# Patient Record
Sex: FEMALE | Race: BLACK OR AFRICAN AMERICAN | Employment: OTHER | RURAL
[De-identification: names, ages, dates, MRNs, and addresses within clinical notes are randomized per-mention and may not be internally consistent; named-entity substitution may affect disease eponyms.]

---
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
## 2018-06-20 ENCOUNTER — OFFICE VISIT (OUTPATIENT)
Dept: INTERNAL MEDICINE CLINIC | Age: 18
End: 2018-06-20

## 2018-06-20 VITALS
BODY MASS INDEX: 41.04 KG/M2 | RESPIRATION RATE: 16 BRPM | TEMPERATURE: 97.7 F | OXYGEN SATURATION: 99 % | DIASTOLIC BLOOD PRESSURE: 76 MMHG | SYSTOLIC BLOOD PRESSURE: 128 MMHG | HEART RATE: 67 BPM | WEIGHT: 223 LBS | HEIGHT: 62 IN

## 2018-06-20 DIAGNOSIS — J06.9 UPPER RESPIRATORY TRACT INFECTION, UNSPECIFIED TYPE: Primary | ICD-10-CM

## 2018-06-20 PROBLEM — E66.01 OBESITY, MORBID (HCC): Status: ACTIVE | Noted: 2018-06-20

## 2018-06-20 RX ORDER — CLOMIPHENE CITRATE 50 MG/1
50 TABLET ORAL DAILY
COMMUNITY
End: 2018-11-30 | Stop reason: ALTCHOICE

## 2018-06-20 RX ORDER — METHYLPREDNISOLONE 4 MG/1
TABLET ORAL
COMMUNITY
End: 2018-11-30 | Stop reason: ALTCHOICE

## 2018-06-20 RX ORDER — ALBUTEROL SULFATE 90 UG/1
AEROSOL, METERED RESPIRATORY (INHALATION)
COMMUNITY

## 2018-06-20 NOTE — PATIENT INSTRUCTIONS
Honey or agave nectar is known to help a cough, 1 teaspoon every 4 hours as needed for cough. Medicines like Mucinex Theraflu or Coricidin may help. Cold symptoms get better on there own without antibiotics. Over the counter cold medications should not be used for children. Acetaminophen (Tylenol) can help with the pain. You can take 2 every 8 hours or up to 6 extra strength (500mg)  Tylenol per day. Aleve or Advil can also be tried.

## 2018-06-20 NOTE — LETTER
NOTIFICATION OF RETURN TO WORK 
 
6/20/2018 3:42 PM 
 
Ms. Judith Carter [address] [id number] Dennis Sandoval To Whom It May Concern: 
 
Judith Carter was under the care of [address]. She will be able to return to work on June 22, 2018. If there are questions or concerns please have the patient contact our office. Sincerely, Chato Ruiz MD

## 2018-06-20 NOTE — MR AVS SNAPSHOT
[address] [address] [place] [address] 
[phone] Patient: Ray Turner MRN: [id number] WVT:3/5/1967 Visit Information Date & Time Provider Department Dept. Phone Encounter #  
 6/20/2018  3:15 PM Mahnaz Alonzo MD [place] [id number] Upcoming Health Maintenance Date Due Hepatitis A Peds Age 1-18 (1 of 2 - Standard Series) 1/5/1999 DTaP/Tdap/Td series (1 - Tdap) 1/5/2005 HPV Age 9Y-34Y (1 of 3 - Female 3 Dose Series) 1/5/2009 Influenza Age 5 to Adult 8/1/2018 Allergies as of 6/20/2018  Review Complete On: 6/20/2018 By: Mahnaz Alonzo MD  
  
 Severity Noted Reaction Type Reactions Pcn [Penicillins]  09/15/2017    Rash Current Immunizations  Reviewed on 9/18/2017 Name Date  
 TB Skin Test (PPD) Intradermal 9/18/2017 Not reviewed this visit You Were Diagnosed With   
  
 Codes Comments Upper respiratory tract infection, unspecified type    -  Primary ICD-10-CM: J06.9 ICD-9-CM: 465.9 Vitals BP Pulse Temp Resp Height(growth percentile) Weight(growth percentile) 128/76 (BP 1 Location: Left arm, BP Patient Position: Sitting) 67 97.7 °F (36.5 °C) (Oral) 16 5' 2\" (1.575 m) 223 lb (101.2 kg) LMP SpO2 BMI OB Status Smoking Status 06/07/2018 99% 40.79 kg/m2 Having regular periods Never Smoker BMI and BSA Data Body Mass Index Body Surface Area 40.79 kg/m 2 2.1 m 2 Preferred Pharmacy Pharmacy Name Phone [address], [address] [phone] Your Updated Medication List  
  
   
This list is accurate as of 6/20/18  3:38 PM.  Always use your most recent med list.  
  
  
  
  
 clomiPHENE 50 mg tablet Commonly known as:  CLOMID Take 50 mg by mouth daily. folic acid 1 mg tablet Commonly known as:  Google Take 1 Tab by mouth daily. methylPREDNISolone 4 mg tablet Commonly known as:  Travis Sinks Take  by mouth. PNV38-Iron Cbn&Gluc-FA-DSS-DHA 35-1- mg Cmpk Take  by mouth. VENTOLIN HFA 90 mcg/actuation inhaler Generic drug:  albuterol Take  by inhalation. Patient Instructions Honey or agave nectar is known to help a cough, 1 teaspoon every 4 hours as needed for cough. Medicines like Mucinex Theraflu or Coricidin may help. Cold symptoms get better on there own without antibiotics. Over the counter cold medications should not be used for children. Acetaminophen (Tylenol) can help with the pain. You can take 2 every 8 hours or up to 6 extra strength (500mg)  Tylenol per day. Aleve or Advil can also be tried. Introducing [facility]! Wilian Baptiste introduces InfoDif patient portal. Now you can access parts of your medical record, email your doctor's office, and request medication refills online. 1. In your internet browser, go to [email or web address] 2. Click on the First Time User? Click Here link in the Sign In box. You will see the New Member Sign Up page. 3. Enter your InfoDif Access Code exactly as it appears below. You will not need to use this code after youve completed the sign-up process. If you do not sign up before the expiration date, you must request a new code. · InfoD[employer] Access Code: [email or web address] Expires: 9/18/2018  3:10 PM 
 
4. Enter the last four digits of your Social Security Number (xxxx) and Date of Birth (mm/dd/yyyy) as indicated and click Submit. You will be taken to the next sign-up page. 5. Create a InfoDif ID. This will be your InfoDif login ID and cannot be changed, so think of one that is secure and easy to remember. 6. Create a [employer] password. You can change your password at any time. 7. Enter your Password Reset Question and Answer. This can be used at a later time if you forget your password. 8. Enter your e-mail address. You will receive e-mail notification when new information is available in [address] 9. Click Sign Up. You can now view and download portions of your medical record. 10. Click the Download Summary menu link to download a portable copy of your medical information. If you have questions, please visit the Frequently Asked Questions section of the Sp[employer] website. Remember, Spoqa is NOT to be used for urgent needs. For medical emergencies, dial 911. Now available from your iPhone and Android! Please provide this summary of care documentation to your next provider. Your primary care clinician is listed as Page Hansen. If you have any questions after today's visit, please call [phone].

## 2018-06-20 NOTE — PROGRESS NOTES
Subjective:   Juan Hooker is a 19 y.o. female who complains of congestion, sneezing, nasal blockage, cough described as productive of yellow sputum and headache N/V for 6 days, gradually worsening since that time. She denies a history of rash. Evaluation to date: seen previously and thought to have a viral URI by the [place] [facility]  Treatment to date: steroids. Her father gave her some LO  Patient does not smoke cigarettes. Relevant PMH: No pertinent additional PMH. Allergies   Allergen Reactions    Pcn [Penicillins] Rash         Patient Active Problem List    Diagnosis Date Noted    Obesity, morbid ([address] 06/20/2018     Current Outpatient Prescriptions   Medication Sig Dispense Refill    clomiPHENE (CLOMID) 50 mg tablet Take 50 mg by mouth daily.  methylPREDNISolone (MEDROL DOSEPACK) 4 mg tablet Take  by mouth.  albuterol (VENTOLIN HFA) 90 mcg/actuation inhaler Take  by inhalation.  PNV38-Iron Cbn&Gluc-FA-DSS-DHA 35-1- mg cmpk Take  by mouth.  folic acid (FOLVITE) 1 mg tablet Take 1 Tab by mouth daily. 30 Tab 5     Allergies   Allergen Reactions    Amoxicillin Itching    Pcn [Penicillins] Rash     Social History   Substance Use Topics    Smoking status: Never Smoker    Smokeless tobacco: Never Used    Alcohol use No        Review of Systems  Pertinent items are noted in HPI. Objective:     Visit Vitals    /76 (BP 1 Location: Left arm, BP Patient Position: Sitting)    Pulse 67    Temp 97.7 °F (36.5 °C) (Oral)    Resp 16    Ht 5' 2\" (1.575 m)    Wt 223 lb (101.2 kg)    LMP 06/07/2018    SpO2 99%    BMI 40.79 kg/m2     General:  alert, cooperative, no distress   Eyes: negative   Ears: normal TM's and external ear canals AU   Sinuses: Normal paranasal sinuses without tenderness   Mouth:  Lips, mucosa, and tongue normal. Teeth and gums normal   Neck: supple, symmetrical, trachea midline and no adenopathy. Heart: S1 and S2 normal, no murmurs noted. Lungs: clear to auscultation bilaterally   Abdomen: soft, non-tender. Bowel sounds normal. No masses,  no organomegaly      Assessment/Plan:   viral upper respiratory illness  Suggested symptomatic OTC remedies. RTC prn. Discussed diagnosis and treatment of viral URIs. Discussed the importance of avoiding unnecessary antibiotic therapy. Encounter Diagnoses   Name Primary?  Upper respiratory tract infection, unspecified type Yes     Orders Placed This Encounter    clomiPHENE (CLOMID) 50 mg tablet    methylPREDNISolone (MEDROL DOSEPACK) 4 mg tablet    albuterol (VENTOLIN HFA) 90 mcg/actuation inhaler   . Orders Placed This Encounter    clomiPHENE (CLOMID) 50 mg tablet     Sig: Take 50 mg by mouth daily.  methylPREDNISolone (MEDROL DOSEPACK) 4 mg tablet     Sig: Take  by mouth.  albuterol (VENTOLIN HFA) 90 mcg/actuation inhaler     Sig: Take  by inhalation.      Follow-up Disposition: Not on File  Call if not better by Friday

## 2018-06-20 NOTE — PROGRESS NOTES
Chief Complaint   Patient presents with    Cold Symptoms     RT ER follow up - prod cough thick yellow sputum, fever, chills, nose running and stuffy, ears felt full, headache chest conjestion and dizzy     I have reviewed the patient's medical history in detail and updated the computerized patient record. Health Maintenance reviewed. 1. Have you been to the ER, urgent care clinic since your last visit? Hospitalized since your last visit? Yes    2. Have you seen or consulted any other health care providers outside of the [address] [place] [address] since your last visit? Include any pap smears or colon screening.  Yes  [facility]    Encouraged pt to discuss pt's wishes with spouse/partner/family and bring them in the next appt to follow thru with the Advanced Directive

## 2018-09-11 ENCOUNTER — CLINICAL SUPPORT (OUTPATIENT)
Dept: INTERNAL MEDICINE CLINIC | Age: 18
End: 2018-09-11

## 2018-09-11 VITALS
OXYGEN SATURATION: 100 % | DIASTOLIC BLOOD PRESSURE: 82 MMHG | HEIGHT: 62 IN | TEMPERATURE: 98.2 F | SYSTOLIC BLOOD PRESSURE: 135 MMHG | RESPIRATION RATE: 18 BRPM | HEART RATE: 74 BPM

## 2018-09-11 DIAGNOSIS — Z11.1 ENCOUNTER FOR PPD TEST: Primary | ICD-10-CM

## 2018-09-11 NOTE — PROGRESS NOTES
Chief Complaint   Patient presents with    PPD Placement     Tuberculin skin test applied to left ventral forearm. PPD Placement note  Anika Mccord, 19 y.o. female is here today for placement of PPD test  Reason for PPD test: Employment  Pt taken PPD test before: yes  Verified in allergy area and with patient that they are not allergic to the products PPD is made of (Phenol or Tween). Yes  Is patient taking any oral or IV steroid medication now or have they taken it in the last month? no  Has the patient ever received the BCG vaccine?: no  Has the patient been in recent contact with anyone known or suspected of having active TB disease?: no       Date of exposure (if applicable): n/a       Name of person they were exposed to (if applicable): n/a  O: Alert and oriented in NAD. P:  PPD placed on 9/11/2018. Patient advised to return for reading within 48-72 hours.

## 2018-09-13 LAB
MM INDURATION POC: 0 MM (ref 0–5)
PPD POC: NEGATIVE NEGATIVE

## 2018-11-30 ENCOUNTER — OFFICE VISIT (OUTPATIENT)
Dept: INTERNAL MEDICINE CLINIC | Age: 18
End: 2018-11-30

## 2018-11-30 VITALS
WEIGHT: 205.6 LBS | BODY MASS INDEX: 37.84 KG/M2 | SYSTOLIC BLOOD PRESSURE: 112 MMHG | OXYGEN SATURATION: 98 % | HEIGHT: 62 IN | HEART RATE: 65 BPM | DIASTOLIC BLOOD PRESSURE: 73 MMHG | RESPIRATION RATE: 16 BRPM | TEMPERATURE: 97.8 F

## 2018-11-30 DIAGNOSIS — R03.0 ELEVATED BLOOD PRESSURE READING: ICD-10-CM

## 2018-11-30 DIAGNOSIS — F32.1 MODERATE MAJOR DEPRESSION (HCC): Primary | ICD-10-CM

## 2018-11-30 RX ORDER — CITALOPRAM 20 MG/1
20 TABLET, FILM COATED ORAL DAILY
Qty: 30 TAB | Refills: 1 | Status: SHIPPED | OUTPATIENT
Start: 2018-11-30 | End: 2019-09-23

## 2018-11-30 NOTE — PROGRESS NOTES
PROGRESS NOTE SUBJECTIVE: 
Diagnosis/Chief Complaint: Depression (PATIENT WILL BE RETURNING TO [facility], THERAPIST) and Blood Pressure Check Agree with comments, see chief complaint. PHQ 9=16 Doing well with mood no Symptoms sad depressed Suicidal: no 
Side affects: no 
States taking medications per medicine list.no 
 
Patient Active Problem List  
 Diagnosis Date Noted  Obesity, morbid ([address] 06/20/2018 Allergies Allergen Reactions  Amoxicillin Itching  Pcn [Penicillins] Rash Social History Tobacco Use  Smoking status: Current Every Day Smoker  Smokeless tobacco: Never Used Substance Use Topics  Alcohol use: No  
  
 
OBJECTIVE: . 
Visit Vitals /73 (BP 1 Location: Right arm, BP Patient Position: Sitting) Pulse 65 Temp 97.8 °F (36.6 °C) (Oral) Resp 16 Ht 5' 2\" (1.575 m) Wt 205 lb 9.6 oz (93.3 kg) LMP 11/07/2018 (Exact Date) SpO2 98% BMI 37.60 kg/m² WDWN in NAD Heart RRR, no:C/M/R Lungs CTA No wheezes, rales or rhonchi Abdo: soft no tenderness, rebound or guarding Neurological exam[de-identified] 2-12 intact Psychiatric: Normal mood, judgement Reviewed: Medications, allergies, clinical lab test results and imaging results have been reviewed. Any abnormal findings have been addressed. ASSESSMENT:  
 
  ICD-10-CM ICD-9-CM 1. Moderate major depression (HCC) F32.1 296.22 citalopram (CELEXA) 20 mg tablet 2. Elevated blood pressure reading R03.0 796.2 PLAN Orders Placed This Encounter  citalopram (CELEXA) 20 mg tablet Sig: Take 1 Tab by mouth daily. Half a tab daily for 4 days then 1 a day. Dispense:  30 Tab Refill:  1 Discussed possible side affects, precautions, and drug interactions and possible benefits of the medication(s). See patient instructions, went over them personally with the patient. Emphasized compliance. Questions answered. Patient states that they understand the plan of action and will call if there are any issues or misunderstandings. Follow-up Disposition: 
Return in about 4 weeks (around 12/28/2018).

## 2018-11-30 NOTE — PATIENT INSTRUCTIONS
Depression and Chronic Disease: Care Instructions Your Care Instructions A chronic disease is one that you have for a long time. Some chronic diseases can be controlled, but they usually cannot be cured. Depression is common in people with chronic diseases, but it often goes unnoticed. Many people have concerns about seeking treatment for a mental health problem. You may think it's a sign of weakness, or you don't want people to know about it. It's important to overcome these reasons for not seeking treatment. Treating depression or anxiety is good for your health. Follow-up care is a key part of your treatment and safety. Be sure to make and go to all appointments, and call your doctor if you are having problems. It's also a good idea to know your test results and keep a list of the medicines you take. How can you care for yourself at home? Watch for symptoms of depression The symptoms of depression are often subtle at first. You may think they are caused by your disease rather than depression. Or you may think it is normal to be depressed when you have a chronic disease. If you are depressed you may: · Feel sad or hopeless. · Feel guilty or worthless. · Not enjoy the things you used to enjoy. · Feel hopeless, as though life is not worth living. · Have trouble thinking or remembering. · Have low energy, and you may not eat or sleep well. · Pull away from others. · Think often about death or killing yourself. (Keep the numbers for these national suicide hotlines: 4-849-414-TALK [1-189.926.2733] and 5-832-UGFWFWU [1-153.147.7520]. ) Get treatment By treating your depression, you can feel more hopeful and have more energy. If you feel better, you may take better care of yourself, so your health may improve. · Talk to your doctor if you have any changes in mood during treatment for your disease. · Ask your doctor for help.  Counseling, antidepressant medicine, or a combination of the two can help most people with depression. Often a combination works best. Counseling can also help you cope with having a chronic disease. When should you call for help? Call 911 anytime you think you may need emergency care. For example, call if: 
  · You feel like hurting yourself or someone else.  
  · Someone you know has depression and is about to attempt or is attempting suicide.  
[facility] your doctor now or seek immediate medical care if: 
  · You hear voices.  
  · Someone you know has depression and: 
? Starts to give away his or her possessions. ? Uses illegal drugs or drinks alcohol heavily. ? Talks or writes about death, including writing suicide notes or talking about guns, knives, or pills. ? Starts to spend a lot of time alone. ? Acts very aggressively or suddenly appears calm.  
 Watch closely for changes in your health, and be sure to contact your doctor if: 
  · You do not get better as expected. Where can you learn more? Go to http://janice-tenzin.info/. Enter R923 in the search box to learn more about \"Depression and Chronic Disease: Care Instructions. \" Current as of: December 7, 2017 Content Version: 11.8 © 0587-2255 [employer]. Care instructions adapted under license by [employer] (which disclaims liability or warranty for this information). If you have questions about a medical condition or this instruction, always ask your healthcare professional. [place] [address] any warranty or liability for your use of this information. Depression Treatment: Care Instructions Your Care Instructions Depression is a condition that affects the way you feel, think, and act. It causes symptoms such as low energy, loss of interest in daily activities, and sadness or grouchiness that goes on for a long time. Depression is very common and affects men and women of all ages. Depression is a medical illness caused by changes in the natural chemicals in your brain. It is not a character flaw, and it does not mean that you are a bad or weak person. It does not mean that you are going crazy. It is important to know that depression can be treated. Medicines, counseling, and self-care can all help. Many people do not get help because they are embarrassed or think that they will get over the depression on their own. But some people do not get better without treatment. Follow-up care is a key part of your treatment and safety. Be sure to make and go to all appointments, and call your doctor if you are having problems. It's also a good idea to know your test results and keep a list of the medicines you take. How can you care for yourself at home? Learn about antidepressant medicines Antidepressant medicines can improve or end the symptoms of depression. You may need to take the medicine for at least 6 months, and often longer. Keep taking your medicine even if you feel better. If you stop taking it too soon, your symptoms may come back or get worse. You may start to feel better within 1 to 3 weeks of taking antidepressant medicine. But it can take as many as 6 to 8 weeks to see more improvement. Talk to your doctor if you have problems with your medicine or if you do not notice any improvement after 3 weeks. Antidepressants can make you feel tired, dizzy, or nervous. Some people have dry mouth, constipation, headaches, sexual problems, an upset stomach, or diarrhea. Many of these side effects are mild and go away on their own after you take the medicine for a few weeks. Some may last longer. Talk to your doctor if side effects bother you too much. You might be able to try a different medicine. If you are pregnant or breastfeeding, talk to your doctor about what medicines you can take. Learn about counseling In many cases, counseling can work as well as medicines to treat mild to moderate depression. Counseling is done by licensed mental health providers, such as psychologists, social workers, and some types of nurses. It can be done in one-on-one sessions or in a group setting. Many people find group sessions helpful. Cognitive-behavioral therapy is a type of counseling. In this treatment therapy, you learn how to see and change unhelpful thinking styles that may be adding to your depression. Counseling and medicines often work well when used together. To manage depression · Be physically active. Getting 30 minutes of exercise each day is good for your body and your mind. Begin slowly if it is hard for you to get started. If you already exercise, keep it up. · Plan something pleasant for yourself every day. Include activities that you have enjoyed in the past. 
· Get enough sleep. Talk to your doctor if you have problems sleeping. · Eat a balanced diet. If you do not feel hungry, eat small snacks rather than large meals. · Do not drink alcohol, use illegal drugs, or take medicines that your doctor has not prescribed for you. They may interfere with your treatment. · Spend time with family and friends. It may help to speak openly about your depression with people you trust. 
· Take your medicines exactly as prescribed. Call your doctor if you think you are having a problem with your medicine. · Do not make major life decisions while you are depressed. Depression may change the way you think. You will be able to make better decisions after you feel better. · Think positively. Challenge negative thoughts with statements such as \"I am hopeful\"; \"Things will get better\"; and \"I can ask for the help I need. \" Write down these statements and read them often, even if you don't believe them yet. · Be patient with yourself. It took time for your depression to develop, and it will take time for your symptoms to improve. Do not take on too much or be too hard on yourself. · Learn all you can about depression from written and online materials. · Check out behavioral health classes to learn more about dealing with depression. · Keep the numbers for these national suicide hotlines: 6-675-748-TALK (1-851.347.5448) and 7-621-IVGHSNC (8-192.963.6641). If you or someone you know talks about suicide or feeling hopeless, get help right away. When should you call for help? Call 911 anytime you think you may need emergency care. For example, call if: 
  · You feel you cannot stop from hurting yourself or someone else.  
[facility] your doctor now or seek immediate medical care if: 
  · You hear voices.  
  · You feel much more depressed.  
 Watch closely for changes in your health, and be sure to contact your doctor if: 
  · You are having problems with your depression medicine.  
  · You are not getting better as expected. Where can you learn more? Go to http://janice-tenzin.info/. Enter K033 in the search box to learn more about \"Depression Treatment: Care Instructions. \" Current as of: December 7, 2017 Content Version: 11.8 © 5200-8834 [employer]. Care instructions adapted under license by [employer] (which disclaims liability or warranty for this information). If you have questions about a medical condition or this instruction, always ask your healthcare professional. [address] any warranty or liability for your use of this information.

## 2019-09-23 ENCOUNTER — OFFICE VISIT (OUTPATIENT)
Dept: INTERNAL MEDICINE CLINIC | Age: 19
End: 2019-09-23

## 2019-09-23 VITALS
DIASTOLIC BLOOD PRESSURE: 79 MMHG | SYSTOLIC BLOOD PRESSURE: 127 MMHG | TEMPERATURE: 98.2 F | HEIGHT: 62 IN | WEIGHT: 174 LBS | BODY MASS INDEX: 32.02 KG/M2 | HEART RATE: 73 BPM | RESPIRATION RATE: 16 BRPM | OXYGEN SATURATION: 99 %

## 2019-09-23 DIAGNOSIS — R10.9 FLANK PAIN: ICD-10-CM

## 2019-09-23 DIAGNOSIS — R10.33 PERIUMBILICAL ABDOMINAL PAIN: ICD-10-CM

## 2019-09-23 DIAGNOSIS — M54.9 OTHER ACUTE BACK PAIN: Primary | ICD-10-CM

## 2019-09-23 LAB
BILIRUB UR QL STRIP: NORMAL
BILIRUB UR QL STRIP: NORMAL
GLUCOSE UR-MCNC: NEGATIVE MG/DL
GLUCOSE UR-MCNC: NEGATIVE MG/DL
HCG URINE, QL. (POC): NEGATIVE
KETONES P FAST UR STRIP-MCNC: NORMAL MG/DL
KETONES P FAST UR STRIP-MCNC: NORMAL MG/DL
PH UR STRIP: 5.5 [PH] (ref 4.6–8)
PH UR STRIP: 5.5 [PH] (ref 4.6–8)
PROT UR QL STRIP: NEGATIVE
PROT UR QL STRIP: NEGATIVE
SP GR UR STRIP: 1.02 (ref 1–1.03)
SP GR UR STRIP: 1.02 (ref 1–1.03)
UA UROBILINOGEN AMB POC: NORMAL (ref 0.2–1)
UA UROBILINOGEN AMB POC: NORMAL (ref 0.2–1)
URINALYSIS CLARITY POC: CLEAR
URINALYSIS CLARITY POC: CLEAR
URINALYSIS COLOR POC: NORMAL
URINALYSIS COLOR POC: YELLOW
URINE BLOOD POC: NEGATIVE
URINE BLOOD POC: NEGATIVE
URINE LEUKOCYTES POC: NEGATIVE
URINE LEUKOCYTES POC: NEGATIVE
URINE NITRITES POC: NEGATIVE
URINE NITRITES POC: NEGATIVE
VALID INTERNAL CONTROL?: YES

## 2019-09-23 RX ORDER — NITROFURANTOIN 25; 75 MG/1; MG/1
CAPSULE ORAL
Refills: 0 | COMMUNITY
Start: 2019-09-21 | End: 2020-06-03 | Stop reason: ALTCHOICE

## 2019-09-23 RX ORDER — KETOROLAC TROMETHAMINE 10 MG/1
10 TABLET, FILM COATED ORAL
Qty: 9 TAB | Refills: 0 | Status: SHIPPED | OUTPATIENT
Start: 2019-09-23 | End: 2020-06-03 | Stop reason: ALTCHOICE

## 2019-09-23 NOTE — PROGRESS NOTES
C/o back and side pain X 4 days - urinary frequency and urgency - started Nitrofurantoin 3 days ago from [facility], LPN  0/59/0736  5:96 PM

## 2019-09-24 ENCOUNTER — DOCUMENTATION ONLY (OUTPATIENT)
Dept: INTERNAL MEDICINE CLINIC | Age: 19
End: 2019-09-24

## 2019-09-24 NOTE — PROGRESS NOTES
Request has been approved for Keotrolac 10 mg Tabs Key: [id number]  Questionnaire submitted. PA Case [id number] Status: Approved. Authorization and Notifications Completed.

## 2019-09-25 NOTE — PROGRESS NOTES
HISTORY OF PRESENT ILLNESS  Holly Bridges is a 22 y.o. female. Flank pain has been rad towards the front affecting her abdo    Back Pain    The history is provided by the patient. This is a new problem. The current episode started more than 1 week ago. The problem has been gradually improving. Patient reports not work related injury. The pain is associated with no known injury. The pain is present in the lower back, left side and right side. Associated symptoms include dysuria. Pertinent negatives include no chest pain, no fever and no bladder incontinence. Treatments tried: given macrobid but did not test urine. The treatment provided mild relief. Urinary Frequency    The current episode started more than 2 days ago. The problem has been gradually improving. There has been no fever. Associated symptoms include frequency, flank pain and back pain. Pertinent negatives include no hematuria. She has tried antibiotics for the symptoms. The treatment provided mild relief. Patient Active Problem List   Diagnosis Code    Obesity, morbid ([address] E66.01    Moderate major depression ([facility] [address] F32.1       No past surgical history on file. Review of Systems   Constitutional: Negative for fever. Cardiovascular: Negative for chest pain. Genitourinary: Positive for dysuria, flank pain and frequency. Negative for bladder incontinence and hematuria. Musculoskeletal: Positive for back pain. Neurological: Negative for dizziness. Physical Exam  Visit Vitals  /79 (BP 1 Location: Left arm, BP Patient Position: At rest)   Pulse 73   Temp 98.2 °F (36.8 °C) (Oral)   Resp 16   Ht 5' 2\" (1.575 m)   Wt 174 lb (78.9 kg)   SpO2 99%   BMI 31.83 kg/m²     WD WN female NAD  Heart RRR without murmers clicks or rubs  Lungs CTA  Abdo soft nontender  Ext no edema    ASSESSMENT and PLAN  Encounter Diagnoses   Name Primary?     Other acute back pain Yes    Flank pain     Periumbilical abdominal pain      Orders Placed This Encounter    AMB POC URINALYSIS DIP STICK AUTO W/O MICRO    AMB POC URINALYSIS DIP STICK AUTO W/ MICRO    AMB POC URINE PREGNANCY TEST, VISUAL COLOR COMPARISON    nitrofurantoin, macrocrystal-monohydrate, (MACROBID) 100 mg capsule    ketorolac (TORADOL) 10 mg tablet     OIf no better after 2-3 days get the following labs:CBC CMP and lippase

## 2019-10-16 ENCOUNTER — TELEPHONE (OUTPATIENT)
Dept: INTERNAL MEDICINE CLINIC | Age: 19
End: 2019-10-16

## 2019-10-16 DIAGNOSIS — F32.1 MODERATE MAJOR DEPRESSION (HCC): Primary | ICD-10-CM

## 2019-10-16 NOTE — TELEPHONE ENCOUNTER
From [facility]  *Medication Refill*   Caller/relationship: PT   Best contact: [phone]   Medication/dosage: \"citalotranhbr\" anxiety medicine   Out of this medication: YES   Pharmacy name/phone number: [id number] #[phone]

## 2019-10-23 RX ORDER — CITALOPRAM 20 MG/1
20 TABLET, FILM COATED ORAL DAILY
Qty: 30 TAB | Refills: 2 | Status: SHIPPED | OUTPATIENT
Start: 2019-10-23 | End: 2020-06-03 | Stop reason: SINTOL

## 2020-01-22 ENCOUNTER — CLINICAL SUPPORT (OUTPATIENT)
Dept: INTERNAL MEDICINE CLINIC | Age: 20
End: 2020-01-22

## 2020-01-22 DIAGNOSIS — Z11.1 PPD SCREENING TEST: Primary | ICD-10-CM

## 2020-01-22 NOTE — PROGRESS NOTES
Chief Complaint   Patient presents with    PPD Placement     Dmitri Taylor from Dr. Sung Farrell to give the patient a PPD shot, pt tolerated well.     PPD   NDC 45402-455-59  0.1ml intradermally  Lower L/forearm  [facility]  [id number]  25 Mar 2022

## 2020-01-24 LAB
MM INDURATION POC: 0 MM (ref 0–5)
PPD POC: NEGATIVE NEGATIVE

## 2020-01-31 ENCOUNTER — OFFICE VISIT (OUTPATIENT)
Dept: INTERNAL MEDICINE CLINIC | Age: 20
End: 2020-01-31

## 2020-01-31 VITALS
OXYGEN SATURATION: 98 % | SYSTOLIC BLOOD PRESSURE: 112 MMHG | DIASTOLIC BLOOD PRESSURE: 75 MMHG | HEART RATE: 95 BPM | WEIGHT: 173 LBS | TEMPERATURE: 98.2 F | BODY MASS INDEX: 31.64 KG/M2 | RESPIRATION RATE: 16 BRPM

## 2020-01-31 DIAGNOSIS — J45.20 MILD INTERMITTENT REACTIVE AIRWAY DISEASE WITHOUT COMPLICATION: ICD-10-CM

## 2020-01-31 DIAGNOSIS — J11.1 INFLUENZA: Primary | ICD-10-CM

## 2020-01-31 RX ORDER — CODEINE PHOSPHATE AND GUAIFENESIN 10; 100 MG/5ML; MG/5ML
5 SOLUTION ORAL
Qty: 120 ML | Refills: 0 | Status: SHIPPED | OUTPATIENT
Start: 2020-01-31 | End: 2020-02-05

## 2020-01-31 RX ORDER — ALBUTEROL SULFATE 0.83 MG/ML
2.5 SOLUTION RESPIRATORY (INHALATION)
Qty: 50 EACH | Refills: 1 | Status: SHIPPED | OUTPATIENT
Start: 2020-01-31 | End: 2020-06-03 | Stop reason: SDUPTHER

## 2020-01-31 NOTE — PROGRESS NOTES
Chief Complaint   Patient presents with    Cold Symptoms     [facility] - ER 3 days ago, prod cough thick yellow sputum, chest and ribcage pain, SOB, nose running, throat sore, fever, wheezing at night     I have reviewed the patient's medical history in detail and updated the computerized patient record. Health Maintenance reviewed. 1. Have you been to the ER, urgent care clinic since your last visit? Hospitalized since your last visit? yes    2. Have you seen or consulted any other health care providers outside of the [address] [place] [address] since your last visit? Include any pap smears or colon screening. [facility] - ER       Encouraged pt to discuss pt's wishes with spouse/partner/family and bring them in the next appt to follow thru with the Advanced Directive    Fall Risk Assessment, last 12 mths 1/31/2020   Able to walk? Yes   Fall in past 12 months? No       3 most recent PHQ Screens 1/31/2020   Little interest or pleasure in doing things Nearly every day   Feeling down, depressed, irritable, or hopeless Nearly every day   Total Score PHQ 2 6   Trouble falling or staying asleep, or sleeping too much -   Feeling tired or having little energy -   Poor appetite, weight loss, or overeating -   Feeling bad about yourself - or that you are a failure or have let yourself or your family down -   Trouble concentrating on things such as school, work, reading, or watching TV -   Moving or speaking so slowly that other people could have noticed; or the opposite being so fidgety that others notice -   Thoughts of being better off dead, or hurting yourself in some way -   PHQ 9 Score -   How difficult have these problems made it for you to do your work, take care of your home and get along with others -       Abuse Screening Questionnaire 1/31/2020   Do you ever feel afraid of your partner? N   Are you in a relationship with someone who physically or mentally threatens you? N   Is it safe for you to go home?  Y       ADL Assessment 1/31/2020   Feeding yourself No Help Needed   Getting from bed to chair No Help Needed   Getting dressed No Help Needed   Bathing or showering No Help Needed   Walk across the room (includes cane/walker) No Help Needed   Using the telphone No Help Needed   Taking your medications No Help Needed   Preparing meals No Help Needed   Managing money (expenses/bills) No Help Needed   Moderately strenuous housework (laundry) No Help Needed   Shopping for personal items (toiletries/medicines) No Help Needed   Shopping for groceries No Help Needed   Driving No Help Needed   Climbing a flight of stairs No Help Needed   Getting to places beyond walking distances No Help Needed

## 2020-01-31 NOTE — PROGRESS NOTES
Subjective:   Marivel Quiñones is a 23 y.o. female who complains of congestion, sneezing, sore throat, cough described as productive of yellow sputum, fever, bilateral ear pain and wheezing dyspnic for 4 days, stable since that time. She denies a history of rash on body and weight loss. Evaluation to date: seen previously and thought to have a viral URI. By the emergency room 2 days ago. Treatment to date: Albuterol, OTC products. Patient does smoke cigarettes. Relevant PMH: No pertinent additional PMH. Allergies   Allergen Reactions    Amoxicillin Itching    Pcn [Penicillins] Rash         Patient Active Problem List    Diagnosis Date Noted    Moderate major depression ([facility] [address] 11/30/2018    Obesity, morbid ([facility] [address] 06/20/2018     Allergies   Allergen Reactions    Amoxicillin Itching    Pcn [Penicillins] Rash     Social History     Tobacco Use    Smoking status: Current Every Day Smoker    Smokeless tobacco: Never Used   Substance Use Topics    Alcohol use: No        Review of Systems  Pertinent items are noted in HPI. Objective:     Visit Vitals  /75 (BP 1 Location: Right arm, BP Patient Position: Sitting)   Pulse 95   Temp 98.2 °F (36.8 °C) (Oral)   Resp 16   Wt 173 lb (78.5 kg)   SpO2 98%   BMI 31.64 kg/m²     General:  fatigued, cooperative, no distress, coughing quite a bit   Eyes: negative   Ears: normal TM's and external ear canals AU   Sinuses: Normal paranasal sinuses without tenderness   Mouth:  Lips, mucosa, and tongue normal. Teeth and gums normal   Neck: supple, symmetrical, trachea midline and no adenopathy. Heart: S1 and S2 normal, no murmurs noted. Lungs: clear to auscultation bilaterally   Abdomen: soft, non-tender. Bowel sounds normal. No masses,  no organomegaly      Assessment/Plan:   influenza  Suggested symptomatic OTC remedies. RTC prn. Discussed diagnosis and treatment of viral URIs.   Discussed the importance of avoiding unnecessary antibiotic therapy. Orders Placed This Encounter    guaiFENesin-codeine (ROBITUSSIN AC) 100-10 mg/5 mL solution    albuterol (PROVENTIL VENTOLIN) 2.5 mg /3 mL (0.083 %) nebu       ICD-10-CM ICD-9-CM    1. Influenza J11.1 487.1    2. Mild intermittent reactive airway disease without complication I97.04 670.38 guaiFENesin-codeine (ROBITUSSIN AC) 100-10 mg/5 mL solution     Follow-up and Dispositions    · Return if symptoms worsen or fail to improve.

## 2020-03-17 ENCOUNTER — TELEPHONE (OUTPATIENT)
Dept: INTERNAL MEDICINE CLINIC | Age: 20
End: 2020-03-17

## 2020-06-03 ENCOUNTER — VIRTUAL VISIT (OUTPATIENT)
Dept: INTERNAL MEDICINE CLINIC | Age: 20
End: 2020-06-03

## 2020-06-03 VITALS — HEIGHT: 62 IN | BODY MASS INDEX: 31.64 KG/M2

## 2020-06-03 DIAGNOSIS — F32.1 MODERATE MAJOR DEPRESSION (HCC): Primary | ICD-10-CM

## 2020-06-03 DIAGNOSIS — J45.20 MILD INTERMITTENT ASTHMA WITHOUT COMPLICATION: ICD-10-CM

## 2020-06-03 PROBLEM — J45.909 MILD ASTHMA: Status: ACTIVE | Noted: 2020-06-03

## 2020-06-03 RX ORDER — ALBUTEROL SULFATE 0.83 MG/ML
2.5 SOLUTION RESPIRATORY (INHALATION)
Qty: 50 EACH | Refills: 2 | Status: SHIPPED | OUTPATIENT
Start: 2020-06-03

## 2020-06-03 RX ORDER — VENLAFAXINE 25 MG/1
25 TABLET ORAL
Qty: 30 TAB | Refills: 2 | Status: SHIPPED | OUTPATIENT
Start: 2020-06-03 | End: 2020-08-03 | Stop reason: ALTCHOICE

## 2020-06-03 NOTE — PROGRESS NOTES
Consent: Ganesh Car, who was seen by synchronous (real-time) audio-video technology, and/or her healthcare decision maker, is aware that this patient-initiated, Telehealth encounter on 6/3/2020 is a billable service, with coverage as determined by her insurance carrier. She is aware that she may receive a bill and has provided verbal consent to proceed: Yes. 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 There has been no travel to the infected countries of [place], [place], [place], [place], or [facility], recently. Patient does not live in assisted living or nursing facility. Patient Active Problem List    Diagnosis Date Noted    Mild asthma 06/03/2020    Moderate major depression ([address] 11/30/2018    Obesity, morbid ([facility] [address] 06/20/2018     Allergies   Allergen Reactions    Amoxicillin Itching    Pcn [Penicillins] Rash     Social History     Tobacco Use    Smoking status: Current Every Day Smoker    Smokeless tobacco: Never Used   Substance Use Topics    Alcohol use: No        OBJECTIVE:    .  Visit Vitals   5' 2\" (1.575 m)   LMP 05/03/2020   BMI 31.64 kg/m²     WDWN in NAD  Psychiatric: Normal mood, judgement    Reviewed: Medications, allergies, clinical lab test results and imaging results have been reviewed. Any abnormal findings have been addressed. ASSESSMENT:       ICD-10-CM ICD-9-CM    1. Moderate major depression (HCC) F32.1 296.22 venlafaxine (EFFEXOR) 25 mg tablet   2.  Mild intermittent asthma without complication G38.27 724.72        PLAN    Orders Placed This Encounter    venlafaxine (EFFEXOR) 25 mg tablet Sig: Take 1 Tab by mouth every morning. Start half tab daily for a few days increease as tolerated     Dispense:  30 Tab     Refill:  2    albuterol (PROVENTIL VENTOLIN) 2.5 mg /3 mL (0.083 %) nebu     Sig: 3 mL by Nebulization route every four (4) hours as needed for Wheezing. Dispense:  50 Each     Refill:  2     Discussed possible side affects, precautions, and drug interactions and possible benefits of the medication(s). Current Outpatient Medications   Medication Sig Dispense Refill    venlafaxine (EFFEXOR) 25 mg tablet Take 1 Tab by mouth every morning. Start half tab daily for a few days increease as tolerated 30 Tab 2    albuterol (PROVENTIL VENTOLIN) 2.5 mg /3 mL (0.083 %) nebu 3 mL by Nebulization route every four (4) hours as needed for Wheezing. 50 Each 2    albuterol (VENTOLIN HFA) 90 mcg/actuation inhaler Take  by inhalation. Follow-up and Dispositions    · Return in about 5 weeks (around 7/8/2020) for routine follow up.

## 2020-06-03 NOTE — PROGRESS NOTES
Chief Complaint   Patient presents with    Anxiety     pt cannot eat, anxiety high and  depressed     I have reviewed the patient's medical history in detail and updated the computerized patient record. Health Maintenance reviewed. Patient has not been out of the country in (3-4 months) 90 -120 days, NO diarrhea, NO cough, NO chest conjestion, NO temp. Pt has not been around anyone with these symptoms. 1. Have you been to the ER, urgent care clinic since your last visit? Hospitalizedno    2. Have you seen or consulted any other health care providers outside of the [address] [place] [address] since your last visit? Include any pap smears or colon screening. no    Fall Risk Assessment, last 12 mths 6/3/2020   Able to walk? Yes   Fall in past 12 months? No       3 most recent PHQ Screens 6/3/2020   Little interest or pleasure in doing things Nearly every day   Feeling down, depressed, irritable, or hopeless Nearly every day   Total Score PHQ 2 6   Trouble falling or staying asleep, or sleeping too much -   Feeling tired or having little energy -   Poor appetite, weight loss, or overeating -   Feeling bad about yourself - or that you are a failure or have let yourself or your family down -   Trouble concentrating on things such as school, work, reading, or watching TV -   Moving or speaking so slowly that other people could have noticed; or the opposite being so fidgety that others notice -   Thoughts of being better off dead, or hurting yourself in some way -   PHQ 9 Score -   How difficult have these problems made it for you to do your work, take care of your home and get along with others -       Abuse Screening Questionnaire 6/3/2020   Do you ever feel afraid of your partner? N   Are you in a relationship with someone who physically or mentally threatens you? N   Is it safe for you to go home?  Y       ADL Assessment 6/3/2020   Feeding yourself No Help Needed   Getting from bed to chair No Help Needed Getting dressed No Help Needed   Bathing or showering No Help Needed   Walk across the room (includes cane/walker) No Help Needed   Using the telphone No Help Needed   Taking your medications No Help Needed   Preparing meals No Help Needed   Managing money (expenses/bills) No Help Needed   Moderately strenuous housework (laundry) No Help Needed   Shopping for personal items (toiletries/medicines) No Help Needed   Shopping for groceries No Help Needed   Driving No Help Needed   Climbing a flight of stairs No Help Needed   Getting to places beyond walking distances No Help Needed

## 2020-06-22 ENCOUNTER — HOSPITAL ENCOUNTER (EMERGENCY)
Age: 20
Discharge: HOME | End: 2020-06-22
Attending: EMERGENCY MEDICINE
Payer: COMMERCIAL

## 2020-06-22 VITALS
BODY MASS INDEX: 33.43 KG/M2 | RESPIRATION RATE: 14 BRPM | SYSTOLIC BLOOD PRESSURE: 118 MMHG | WEIGHT: 181.66 LBS | DIASTOLIC BLOOD PRESSURE: 56 MMHG | TEMPERATURE: 97.8 F | HEIGHT: 62 IN | HEART RATE: 81 BPM | OXYGEN SATURATION: 100 %

## 2020-06-22 DIAGNOSIS — E86.0 DEHYDRATION: Primary | ICD-10-CM

## 2020-06-22 LAB
ALBUMIN SERPL-MCNC: 3.9 G/DL (ref 3.5–5)
ALBUMIN/GLOB SERPL: 0.9 {RATIO} (ref 1.1–2.2)
ALP SERPL-CCNC: 58 U/L (ref 45–117)
ALT SERPL-CCNC: 18 U/L (ref 12–78)
ANION GAP SERPL CALC-SCNC: 5 MMOL/L (ref 5–15)
AST SERPL-CCNC: 14 U/L (ref 15–37)
BASOPHILS # BLD: 0.1 K/UL (ref 0–0.1)
BASOPHILS NFR BLD: 1 % (ref 0–1)
BILIRUB SERPL-MCNC: 0.3 MG/DL (ref 0.2–1)
BUN SERPL-MCNC: 9 MG/DL (ref 6–20)
BUN/CREAT SERPL: 10 (ref 12–20)
CALCIUM SERPL-MCNC: 9.4 MG/DL (ref 8.5–10.1)
CHLORIDE SERPL-SCNC: 107 MMOL/L (ref 97–108)
CO2 SERPL-SCNC: 28 MMOL/L (ref 21–32)
CREAT SERPL-MCNC: 0.86 MG/DL (ref 0.55–1.02)
DIFFERENTIAL METHOD BLD: NORMAL
EOSINOPHIL # BLD: 0.1 K/UL (ref 0–0.4)
EOSINOPHIL NFR BLD: 1 % (ref 0–7)
ERYTHROCYTE [DISTWIDTH] IN BLOOD BY AUTOMATED COUNT: 13.5 % (ref 11.5–14.5)
GLOBULIN SER CALC-MCNC: 4.2 G/DL (ref 2–4)
GLUCOSE SERPL-MCNC: 97 MG/DL (ref 65–100)
HCT VFR BLD AUTO: 38.2 % (ref 35–47)
HGB BLD-MCNC: 12.6 G/DL (ref 11.5–16)
IMM GRANULOCYTES # BLD AUTO: 0 K/UL (ref 0–0.04)
IMM GRANULOCYTES NFR BLD AUTO: 0 % (ref 0–0.5)
LYMPHOCYTES # BLD: 3.4 K/UL (ref 0.8–3.5)
LYMPHOCYTES NFR BLD: 36 % (ref 12–49)
MCH RBC QN AUTO: 29.5 PG (ref 26–34)
MCHC RBC AUTO-ENTMCNC: 33 G/DL (ref 30–36.5)
MCV RBC AUTO: 89.5 FL (ref 80–99)
MONOCYTES # BLD: 0.6 K/UL (ref 0–1)
MONOCYTES NFR BLD: 6 % (ref 5–13)
NEUTS SEG # BLD: 5.1 K/UL (ref 1.8–8)
NEUTS SEG NFR BLD: 56 % (ref 32–75)
NRBC # BLD: 0 K/UL (ref 0–0.01)
NRBC BLD-RTO: 0 PER 100 WBC
PLATELET # BLD AUTO: 295 K/UL (ref 150–400)
PMV BLD AUTO: 11.1 FL (ref 8.9–12.9)
POTASSIUM SERPL-SCNC: 3.6 MMOL/L (ref 3.5–5.1)
PROT SERPL-MCNC: 8.1 G/DL (ref 6.4–8.2)
RBC # BLD AUTO: 4.27 M/UL (ref 3.8–5.2)
SODIUM SERPL-SCNC: 140 MMOL/L (ref 136–145)
WBC # BLD AUTO: 9.2 K/UL (ref 3.6–11)

## 2020-06-22 PROCEDURE — 96361 HYDRATE IV INFUSION ADD-ON: CPT

## 2020-06-22 PROCEDURE — 80053 COMPREHEN METABOLIC PANEL: CPT

## 2020-06-22 PROCEDURE — 85025 COMPLETE CBC W/AUTO DIFF WBC: CPT

## 2020-06-22 PROCEDURE — 74011250636 HC RX REV CODE- 250/636: Performed by: EMERGENCY MEDICINE

## 2020-06-22 PROCEDURE — 36415 COLL VENOUS BLD VENIPUNCTURE: CPT

## 2020-06-22 PROCEDURE — 96374 THER/PROPH/DIAG INJ IV PUSH: CPT

## 2020-06-22 PROCEDURE — 99282 EMERGENCY DEPT VISIT SF MDM: CPT

## 2020-06-22 RX ORDER — ONDANSETRON 2 MG/ML
4 INJECTION INTRAMUSCULAR; INTRAVENOUS
Status: COMPLETED | OUTPATIENT
Start: 2020-06-22 | End: 2020-06-22

## 2020-06-22 RX ADMIN — SODIUM CHLORIDE 1000 ML: 900 INJECTION, SOLUTION INTRAVENOUS at 14:54

## 2020-06-22 RX ADMIN — ONDANSETRON 4 MG: 2 INJECTION INTRAMUSCULAR; INTRAVENOUS at 14:59

## 2020-06-22 NOTE — ED PROVIDER NOTES
EMERGENCY DEPARTMENT HISTORY AND PHYSICAL EXAM      Date: 6/22/2020  Patient Name: Ganesh Car  Patient Age and Sex: 23 y.o. female     History of Presenting Illness     Chief Complaint   Patient presents with    Dehydration     Pt arrived to ED from work, states she is overheated and dehydrated. Pt reports she feels lightheaded. History Provided By: Patient    HPI: Ganesh Car  Is a 43-year-old female presenting with lightheadedness and overheating. Patient states that she is an [address]  and has been out in the heat all day. Today started to feel lightheaded and exhausted. Was almost about to pass out when colleagues decided to call EMS and bring her to the ER. Patient denies any current chest pain, shortness of breath just feels weak all over. There are no other complaints, changes, or physical findings at this time. PCP: Edna Cerda MD    No current facility-administered medications on file prior to encounter. Current Outpatient Medications on File Prior to Encounter   Medication Sig Dispense Refill    venlafaxine (EFFEXOR) 25 mg tablet Take 1 Tab by mouth every morning. Start half tab daily for a few days increease as tolerated 30 Tab 2    albuterol (PROVENTIL VENTOLIN) 2.5 mg /3 mL (0.083 %) nebu 3 mL by Nebulization route every four (4) hours as needed for Wheezing. 50 Each 2    albuterol (VENTOLIN HFA) 90 mcg/actuation inhaler Take  by inhalation. Past History     Past Medical History:  Past Medical History:   Diagnosis Date    Depression     GERD (gastroesophageal reflux disease)     Headache     Mild asthma 6/3/2020       Past Surgical History:  No past surgical history on file. Family History:  No family history on file. Social History:  Social History     Tobacco Use    Smoking status: Current Every Day Smoker    Smokeless tobacco: Never Used   Substance Use Topics    Alcohol use: No    Drug use: No       Allergies:   Allergies Allergen Reactions    Amoxicillin Itching    Pcn [Penicillins] Rash         Review of Systems   Review of Systems   Constitutional: Positive for fatigue. Negative for chills and fever. Respiratory: Negative for cough and shortness of breath. Cardiovascular: Negative for chest pain. Gastrointestinal: Negative for abdominal pain, constipation, diarrhea, nausea and vomiting. Genitourinary: Negative for dysuria, frequency and hematuria. Neurological: Positive for light-headedness. Negative for weakness and numbness. All other systems reviewed and are negative. Physical Exam   Physical Exam  Vitals signs and nursing note reviewed. Constitutional:       Appearance: She is well-developed. HENT:      Head: Normocephalic and atraumatic. Mouth/Throat:      Mouth: Mucous membranes are dry. Eyes:      Conjunctiva/sclera: Conjunctivae normal.   Neck:      Musculoskeletal: Normal range of motion and neck supple. Cardiovascular:      Rate and Rhythm: Normal rate and regular rhythm. Pulmonary:      Effort: Pulmonary effort is normal. No respiratory distress. Breath sounds: Normal breath sounds. Abdominal:      General: There is no distension. Palpations: Abdomen is soft. Tenderness: There is no abdominal tenderness. Musculoskeletal: Normal range of motion. Skin:     General: Skin is warm and dry. Neurological:      General: No focal deficit present. Mental Status: She is alert and oriented to person, place, and time. Mental status is at baseline.    Psychiatric:         Mood and Affect: Mood normal.          Diagnostic Study Results     Labs -     Recent Results (from the past 12 hour(s))   CBC WITH AUTOMATED DIFF    Collection Time: 06/22/20  1:39 PM   Result Value Ref Range    WBC 9.2 3.6 - 11.0 K/uL    RBC 4.27 3.80 - 5.20 M/uL    HGB 12.6 11.5 - 16.0 g/dL    HCT 38.2 35.0 - 47.0 %    MCV 89.5 80.0 - 99.0 FL    MCH 29.5 26.0 - 34.0 PG    MCHC 33.0 30.0 - 36.5 g/dL RDW 13.5 11.5 - 14.5 %    PLATELET 523 340 - 196 K/uL    MPV 11.1 8.9 - 12.9 FL    NRBC 0.0 0  WBC    ABSOLUTE NRBC 0.00 0.00 - 0.01 K/uL    NEUTROPHILS 56 32 - 75 %    LYMPHOCYTES 36 12 - 49 %    MONOCYTES 6 5 - 13 %    EOSINOPHILS 1 0 - 7 %    BASOPHILS 1 0 - 1 %    IMMATURE GRANULOCYTES 0 0.0 - 0.5 %    ABS. NEUTROPHILS 5.1 1.8 - 8.0 K/UL    ABS. LYMPHOCYTES 3.4 0.8 - 3.5 K/UL    ABS. MONOCYTES 0.6 0.0 - 1.0 K/UL    ABS. EOSINOPHILS 0.1 0.0 - 0.4 K/UL    ABS. BASOPHILS 0.1 0.0 - 0.1 K/UL    ABS. IMM. GRANS. 0.0 0.00 - 0.04 K/UL    DF AUTOMATED     METABOLIC PANEL, COMPREHENSIVE    Collection Time: 06/22/20  1:39 PM   Result Value Ref Range    Sodium 140 136 - 145 mmol/L    Potassium 3.6 3.5 - 5.1 mmol/L    Chloride 107 97 - 108 mmol/L    CO2 28 21 - 32 mmol/L    Anion gap 5 5 - 15 mmol/L    Glucose 97 65 - 100 mg/dL    BUN 9 6 - 20 MG/DL    Creatinine 0.86 0.55 - 1.02 MG/DL    BUN/Creatinine ratio 10 (L) 12 - 20      GFR est AA >60 >60 ml/min/1.73m2    GFR est non-AA >60 >60 ml/min/1.73m2    Calcium 9.4 8.5 - 10.1 MG/DL    Bilirubin, total 0.3 0.2 - 1.0 MG/DL    ALT (SGPT) 18 12 - 78 U/L    AST (SGOT) 14 (L) 15 - 37 U/L    Alk. phosphatase 58 45 - 117 U/L    Protein, total 8.1 6.4 - 8.2 g/dL    Albumin 3.9 3.5 - 5.0 g/dL    Globulin 4.2 (H) 2.0 - 4.0 g/dL    A-G Ratio 0.9 (L) 1.1 - 2.2         Radiologic Studies -   No orders to display     CT Results  (Last 48 hours)    None        CXR Results  (Last 48 hours)    None            Medical Decision Making   I am the first provider for this patient. I reviewed the vital signs, available nursing notes, past medical history, past surgical history, family history and social history. Vital Signs-Reviewed the patient's vital signs.   Patient Vitals for the past 12 hrs:   Temp Pulse Resp BP SpO2   06/22/20 1336 97.8 °F (36.6 °C) 81 14 118/56 100 %       Records Reviewed: Nursing Notes and Old Medical Records    Provider Notes (Medical Decision Making):   Pt presents with lightheadedness. DDx: dehydration, anemia, electrolyte abnormality, infection, orthostatic hypotension, medication side effect. Will get orthostatics, labs and EKG PRN. ED Course:   Initial assessment performed. The patients presenting problems have been discussed, and they are in agreement with the care plan formulated and outlined with them. I have encouraged them to ask questions as they arise throughout their visit. Critical Care Time:   0    Disposition:  Discharge Note:  The patient has been re-evaluated and is ready for discharge. Reviewed available results with patient. Counseled patient on diagnosis and care plan. Patient has expressed understanding, and all questions have been answered. Patient agrees with plan and agrees to follow up as recommended, or to return to the ED if their symptoms worsen. Discharge instructions have been provided and explained to the patient, along with reasons to return to the ED. PLAN:  Current Discharge Medication List      1.   2.   Follow-up Information     Follow up With Specialties Details Why Contact Info    Garrett Saldana MD [facility]  As needed [address] [place] [address]  [phone]          3. Return to ED if worse     Diagnosis     Clinical Impression:   1. Dehydration        Attestations:    Evan Boateng M.D. Please note that this dictation was completed with [employer], the computer voice recognition software. Quite often unanticipated grammatical, syntax, homophones, and other interpretive errors are inadvertently transcribed by the computer software. Please disregard these errors. Please excuse any errors that have escaped final proofreading. Thank you.

## 2020-06-22 NOTE — ED NOTES
Pt updated on plan of care, pt resting in bed in position of comfort, call bell within reach. Color consistent with ethnicity/race, warm, dry intact, resilient.

## 2020-06-23 ENCOUNTER — PATIENT OUTREACH (OUTPATIENT)
Dept: INTERNAL MEDICINE CLINIC | Age: 20
End: 2020-06-23

## 2020-06-24 ENCOUNTER — DOCUMENTATION ONLY (OUTPATIENT)
Dept: INTERNAL MEDICINE CLINIC | Age: 20
End: 2020-06-24

## 2020-07-07 ENCOUNTER — PATIENT OUTREACH (OUTPATIENT)
Dept: CASE MANAGEMENT | Age: 20
End: 2020-07-07

## 2020-07-07 NOTE — PROGRESS NOTES
Patient resolved from Transition of Care episode on 7/7/2020. Discussed COVID-19 related testing which was not done at this time. Test results were not done. Patient informed of results, if available? N/A. Patient/family has been provided the following resources and education related to COVID-19:                         Signs, symptoms and red flags related to COVID-19            [facility] exposure and quarantine guidelines            Conduit exposure contact - [phone]            Contact for their local Department of Health                 Patient currently reports that the following symptoms have improved:  no new symptoms and no worsening symptoms. No further outreach scheduled with this CTN/ACM/LPN/HC/ MA. Episode of Care resolved. Patient has this CTN/ACM/LPN/HC/MA contact information if future needs arise.

## 2020-07-09 ENCOUNTER — VIRTUAL VISIT (OUTPATIENT)
Dept: INTERNAL MEDICINE CLINIC | Age: 20
End: 2020-07-09

## 2020-07-09 ENCOUNTER — TELEPHONE (OUTPATIENT)
Dept: INTERNAL MEDICINE CLINIC | Age: 20
End: 2020-07-09

## 2020-07-09 DIAGNOSIS — F32.1 MODERATE MAJOR DEPRESSION (HCC): ICD-10-CM

## 2020-07-09 DIAGNOSIS — J45.41 MODERATE PERSISTENT ASTHMA WITH ACUTE EXACERBATION: Primary | ICD-10-CM

## 2020-07-09 DIAGNOSIS — F41.9 ANXIETY: ICD-10-CM

## 2020-07-09 DIAGNOSIS — T67.5XXD HEAT EXHAUSTION, SUBSEQUENT ENCOUNTER: ICD-10-CM

## 2020-07-09 RX ORDER — PREDNISONE 10 MG/1
TABLET ORAL
Qty: 20 TAB | Refills: 0 | Status: SHIPPED | OUTPATIENT
Start: 2020-07-09 | End: 2020-07-24 | Stop reason: ALTCHOICE

## 2020-07-09 RX ORDER — FLUTICASONE PROPIONATE AND SALMETEROL 250; 50 UG/1; UG/1
1 POWDER RESPIRATORY (INHALATION) 2 TIMES DAILY
Qty: 1 EACH | Refills: 5 | Status: SHIPPED | OUTPATIENT
Start: 2020-07-09 | End: 2022-05-09 | Stop reason: ALTCHOICE

## 2020-07-09 NOTE — PROGRESS NOTES
HISTORY OF PRESENT ILLNESS  Favian Anderson is a 23 y.o. female. Breathing Problem   The history is provided by the patient. This is a recurrent problem. The average episode lasts 3 weeks. The problem occurs continuously. Episode onset: 3 weeks. Associated symptoms include chest pain. Pertinent negatives include no cough. The problem's precipitants include weather/humidity. She has had prior hospitalizations. Associated medical issues include asthma. 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 She has had to go the emergency room due to heat exhaustion, has not fully recovered, does not tolerate heat very well. Using her rescue inhaler more. Asthma is worse lately with wheezing. Not taking her venlafaxine very regularly until a few days ago. Minimal improvement in depression so far, see PHQ 9. Review of Systems   Constitutional: Positive for chills. Respiratory: Positive for shortness of breath. Negative for cough. Cardiovascular: Positive for chest pain. Psychiatric/Behavioral: Positive for depression. Negative for suicidal ideas. The patient is nervous/anxious. The patient does not have insomnia.       Patient Active Problem List   Diagnosis Code    Obesity, morbid ([facility] [address] E66.01    Moderate major depression ([facility] [address] F32.1    Mild asthma J45.909     Social History     Socioeconomic History    Marital status: SINGLE     Spouse name: Not on file    Number of children: 0    Years of education: Not on file    Highest education level: Not on file   Occupational History    Occupation: delivery     Comment: [employer]   Social Needs    Financial resource strain: Not on file    Food insecurity     Worry: Not on file     Inability: Not on file   [employer] needs     Medical: Not on file     Non-medical: Not on file   Tobacco Use    Smoking status: Current Every Day Smoker    Smokeless tobacco: Never Used   Substance and Sexual Activity    Alcohol use: No    Drug use: No    Sexual activity: Yes     Partners: Male, Female   Lifestyle    Physical activity     Days per week: Not on file     Minutes per session: Not on file    Stress: Not on file   Relationships    Social connections     Talks on phone: Not on file     Gets together: Not on file     Attends Taoism service: Not on file     Active member of club or organization: Not on file     Attends meetings of clubs or organizations: Not on file     Relationship status: Not on file    Intimate partner violence     Fear of current or ex partner: Not on file     Emotionally abused: Not on file     Physically abused: Not on file     Forced sexual activity: Not on file   Other Topics Concern    Not on file   Social History Narrative    Lives with grandparents     Trying to get pregnant           Physical Exam  No acute distress  ASSESSMENT and PLAN    Orders Placed This Encounter    predniSONE (DELTASONE) 10 mg tablet    beclomethasone (QVAR) 40 mcg/actuation aero    fluticasone propion-salmeteroL (ADVAIR/WIXELA) 250-50 mcg/dose diskus inhaler     We will start oral steroids, start steroid inhaler, Advair, Qvar not covered  Note to keep her out of work for 2 weeks. 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 Reduce rescue inhaler probably worsening anxiety. Note to keep her out of work until she comes back for follow-up  Discussed possible side affects, precautions, and drug interactions and possible benefits of the medication(s).     f/u 2 weeks

## 2020-07-09 NOTE — PROGRESS NOTES
Chief Complaint   Patient presents with    Asthma     asthma flaring up and dehydration, not eating, called out of work ( [employer]), pain 7/10 chest asthma     Health Maintenance reviewed. I have reviewed the patient's medical history in detail and updated the computerized patient record. Patient has not been out of the country in (3-4 months) 90 -120 days, NO diarrhea, NO cough, NO chest conjestion, NO temp. Pt has not been around anyone with these symptoms. 1. Have you been to the ER, urgent care clinic since your last visit? Hospitalized since your last visit?no    2. Have you seen or consulted any other health care providers outside of the [address] [place] [address] since your last visit? Include any pap smears or colon screening. No    Encouraged pt to discuss pt's wishes with spouse/partner/family and bring them in the next appt to follow thru with the Advanced Directive    Fall Risk Assessment, last 12 mths 7/9/2020   Able to walk? Yes   Fall in past 12 months?  No       3 most recent PHQ Screens 7/9/2020   Little interest or pleasure in doing things More than half the days   Feeling down, depressed, irritable, or hopeless More than half the days   Total Score PHQ 2 4   Trouble falling or staying asleep, or sleeping too much -   Feeling tired or having little energy -   Poor appetite, weight loss, or overeating -   Feeling bad about yourself - or that you are a failure or have let yourself or your family down -   Trouble concentrating on things such as school, work, reading, or watching TV -   Moving or speaking so slowly that other people could have noticed; or the opposite being so fidgety that others notice -   Thoughts of being better off dead, or hurting yourself in some way -   PHQ 9 Score -   How difficult have these problems made it for you to do your work, take care of your home and get along with others -       Abuse Screening Questionnaire 7/9/2020   Do you ever feel afraid of your partner? N   Are you in a relationship with someone who physically or mentally threatens you? N   Is it safe for you to go home?  Y       ADL Assessment 7/9/2020   Feeding yourself No Help Needed   Getting from bed to chair No Help Needed   Getting dressed No Help Needed   Bathing or showering No Help Needed   Walk across the room (includes cane/walker) No Help Needed   Using the telphone No Help Needed   Taking your medications No Help Needed   Preparing meals No Help Needed   Managing money (expenses/bills) No Help Needed   Moderately strenuous housework (laundry) No Help Needed   Shopping for personal items (toiletries/medicines) No Help Needed   Shopping for groceries No Help Needed   Driving No Help Needed   Climbing a flight of stairs No Help Needed   Getting to places beyond walking distances No Help Needed

## 2020-07-23 ENCOUNTER — TELEPHONE (OUTPATIENT)
Dept: INTERNAL MEDICINE CLINIC | Age: 20
End: 2020-07-23

## 2020-07-24 ENCOUNTER — VIRTUAL VISIT (OUTPATIENT)
Dept: INTERNAL MEDICINE CLINIC | Age: 20
End: 2020-07-24

## 2020-07-24 DIAGNOSIS — F32.1 MODERATE MAJOR DEPRESSION (HCC): ICD-10-CM

## 2020-07-24 DIAGNOSIS — F41.9 ANXIETY: ICD-10-CM

## 2020-07-24 DIAGNOSIS — Z72.0 TOBACCO ABUSE: ICD-10-CM

## 2020-07-24 DIAGNOSIS — J45.41 MODERATE PERSISTENT ASTHMA WITH ACUTE EXACERBATION: Primary | ICD-10-CM

## 2020-07-24 RX ORDER — BUSPIRONE HYDROCHLORIDE 5 MG/1
5 TABLET ORAL 2 TIMES DAILY
Qty: 60 TAB | Refills: 5 | Status: SHIPPED | OUTPATIENT
Start: 2020-07-24 | End: 2020-09-09 | Stop reason: DRUGHIGH

## 2020-07-24 NOTE — PROGRESS NOTES
Chief Complaint   Patient presents with    Anxiety     anxiety and depression not any better     Health Maintenance reviewed. I have reviewed the patient's medical history in detail and updated the computerized patient record. Patient has not been out of the country in (3-4 months) 90 -120 days, NO diarrhea, NO cough, NO chest conjestion, NO temp. Pt has not been around anyone with these symptoms. 1. Have you been to the ER, urgent care clinic since your last visit? Hospitalized since your last visit?no    2. Have you seen or consulted any other health care providers outside of the [address] [place] [address] since your last visit? Include any pap smears or colon screening. No      Encouraged pt to discuss pt's wishes with spouse/partner/family and bring them in the next appt to follow thru with the Advanced Directive    Fall Risk Assessment, last 12 mths 7/24/2020   Able to walk? Yes   Fall in past 12 months? No       3 most recent PHQ Screens 7/24/2020   Little interest or pleasure in doing things Nearly every day   Feeling down, depressed, irritable, or hopeless Nearly every day   Total Score PHQ 2 6   Trouble falling or staying asleep, or sleeping too much -   Feeling tired or having little energy -   Poor appetite, weight loss, or overeating -   Feeling bad about yourself - or that you are a failure or have let yourself or your family down -   Trouble concentrating on things such as school, work, reading, or watching TV -   Moving or speaking so slowly that other people could have noticed; or the opposite being so fidgety that others notice -   Thoughts of being better off dead, or hurting yourself in some way -   PHQ 9 Score -   How difficult have these problems made it for you to do your work, take care of your home and get along with others -       Abuse Screening Questionnaire 7/24/2020   Do you ever feel afraid of your partner?  N   Are you in a relationship with someone who physically or mentally threatens you? N   Is it safe for you to go home?  Y       ADL Assessment 7/24/2020   Feeding yourself No Help Needed   Getting from bed to chair No Help Needed   Getting dressed No Help Needed   Bathing or showering No Help Needed   Walk across the room (includes cane/walker) No Help Needed   Using the telphone No Help Needed   Taking your medications No Help Needed   Preparing meals No Help Needed   Managing money (expenses/bills) No Help Needed   Moderately strenuous housework (laundry) No Help Needed   Shopping for personal items (toiletries/medicines) No Help Needed   Shopping for groceries No Help Needed   Driving No Help Needed   Climbing a flight of stairs No Help Needed   Getting to places beyond walking distances -

## 2020-07-24 NOTE — PROGRESS NOTES
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 Start 1 daily 60 Tab 5    beclomethasone (QVAR) 40 mcg/actuation aero Take 1 Puff by inhalation two (2) times a day. 1 Inhaler 5    fluticasone propion-salmeteroL (ADVAIR/WIXELA) 250-50 mcg/dose diskus inhaler Take 1 Puff by inhalation two (2) times a day. 1 Each 5    venlafaxine (EFFEXOR) 25 mg tablet Take 1 Tab by mouth every morning. Start half tab daily for a few days increease as tolerated 30 Tab 2    albuterol (PROVENTIL VENTOLIN) 2.5 mg /3 mL (0.083 %) nebu 3 mL by Nebulization route every four (4) hours as needed for Wheezing. 50 Each 2    albuterol (VENTOLIN HFA) 90 mcg/actuation inhaler Take  by inhalation. Allergies   Allergen Reactions    Amoxicillin Itching    Pcn [Penicillins] Rash     Past Medical History:   Diagnosis Date    Depression     GERD (gastroesophageal reflux disease)     Headache     Mild asthma 6/3/2020     Social History     Tobacco Use    Smoking status: Current Every Day Smoker    Smokeless tobacco: Never Used   Substance Use Topics    Alcohol use: No        OBJECTIVE:    . There were no vitals taken for this visit.   WDWN in NAD  Heart RRR, no:C/M/R  Lungs CTA No wheezes, rales or rhonchi  Abdo: soft no tenderness, rebound or guarding  Neurological exam[de-identified] 2-12 intact  Psychiatric: Normal mood, judgement    Reviewed: Medications, allergies, clinical lab test results and imaging results have been reviewed. Any abnormal findings have been addressed. ASSESSMENT:       ICD-10-CM ICD-9-CM    1. Moderate persistent asthma with acute exacerbation  J45.41 493.92    2. Tobacco abuse  Z72.0 305.1    3. Anxiety  F41.9 300.00    4. Moderate major depression (HCC)  F32.1 296.22        PLAN    Orders Placed This Encounter    busPIRone (BUSPAR) 5 mg tablet     Sig: Take 1 Tab by mouth two (2) times a day. Start 1 daily     Dispense:  60 Tab     Refill:  5     Add above in AM  Change efexxor PM  Get new steroid inhaler, call if to expensive    Discussed possible side affects, precautions, and drug interactions and possible benefits of the medication(s). The patient was counseled on the dangers of tobacco use, and was advised to quit. Reviewed strategies to maximize success, including stress management. f?UP 1 mo  Off 1 week to get on new meds    Kenna Lund, who was evaluated through a synchronous (real-time) audio-video encounter, and/or her healthcare decision maker, is aware that it is a billable service, with coverage as determined by her insurance carrier. She provided verbal consent to proceed: Yes, and patient identification was verified. It was conducted pursuant to the emergency declaration under the [facility], [address] [place] [address] authority and the [employer] and [employer] Act. A caregiver was present when appropriate. Ability to conduct physical exam was limited. I was at home. The patient was at home.

## 2020-07-26 PROBLEM — J45.909 MILD ASTHMA: Status: RESOLVED | Noted: 2020-06-03 | Resolved: 2020-07-26

## 2020-07-26 PROBLEM — Z72.0 TOBACCO ABUSE: Status: ACTIVE | Noted: 2020-07-26

## 2020-07-26 PROBLEM — F41.9 ANXIETY: Status: ACTIVE | Noted: 2020-07-26

## 2020-07-26 PROBLEM — J45.909 ASTHMA, MODERATE: Status: ACTIVE | Noted: 2020-07-26

## 2020-08-02 NOTE — DISCHARGE INSTRUCTIONS
Patient Education        Dehydration: Care Instructions  Your Care Instructions  Dehydration happens when your body loses too much fluid. This might happen when you do not drink enough water or you lose large amounts of fluids from your body because of diarrhea, vomiting, or sweating. Severe dehydration can be life-threatening. Water and minerals called electrolytes help put your body fluids back in balance. Learn the early signs of fluid loss, and drink more fluids to prevent dehydration. Follow-up care is a key part of your treatment and safety. Be sure to make and go to all appointments, and call your doctor if you are having problems. It's also a good idea to know your test results and keep a list of the medicines you take. How can you care for yourself at home? · To prevent dehydration, drink plenty of fluids, enough so that your urine is light yellow or clear like water. Choose water and other caffeine-free clear liquids until you feel better. If you have kidney, heart, or liver disease and have to limit fluids, talk with your doctor before you increase the amount of fluids you drink. · If you do not feel like eating or drinking, try taking small sips of water, sports drinks, or other rehydration drinks. · Get plenty of rest.  To prevent dehydration  · Add more fluids to your diet and daily routine, unless your doctor has told you not to. · During hot weather, drink more fluids. Drink even more fluids if you exercise a lot. Stay away from drinks with alcohol or caffeine. · Watch for the symptoms of dehydration. These include:  ? A dry, sticky mouth. ? Dark yellow urine, and not much of it. ? Dry and sunken eyes. ? Feeling very tired. · Learn what problems can lead to dehydration. These include:  ? Diarrhea, fever, and vomiting. ? Any illness with a fever, such as pneumonia or the flu. ?  Activities that cause heavy sweating, such as endurance races and heavy outdoor work in hot or humid weather. ? Alcohol or drug use or problems caused by quitting their use (withdrawal). ? Certain medicines, such as cold and allergy pills (antihistamines), diet pills (diuretics), and laxatives. ? Certain diseases, such as diabetes, cancer, and heart or kidney disease. When should you call for help? [id number] anytime you think you may need emergency care. For example, call if:  · You passed out (lost consciousness). Call your doctor now or seek immediate medical care if:  · You are confused and cannot think clearly. · You are dizzy or lightheaded, or you feel like you may faint. · You have signs of needing more fluids. You have sunken eyes and a dry mouth, and you pass only a little dark urine. · You cannot keep fluids down. Watch closely for changes in your health, and be sure to contact your doctor if:  · You are not making tears. · Your skin is very dry and sags slowly back into place after you pinch it. · Your mouth and eyes are very dry. Where can you learn more? Go to http://janice-tenzin.info/  Enter Q814 in the search box to learn more about \"Dehydration: Care Instructions. \"  Current as of: June 26, 2019               Content Version: 12.5  © 0498-4731 [employer]wise, Incorporated. Care instructions adapted under license by [employer] (which disclaims liability or warranty for this information). If you have questions about a medical condition or this instruction, always ask your healthcare professional. [place] [address] any warranty or liability for your use of this information. I have personally seen and examined this patient.  I have fully participated in the care of this patient. I have reviewed all pertinent clinical information, including history, physical exam, plan and the Resident’s note and agree except as noted.

## 2020-08-03 ENCOUNTER — VIRTUAL VISIT (OUTPATIENT)
Dept: INTERNAL MEDICINE CLINIC | Age: 20
End: 2020-08-03
Payer: COMMERCIAL

## 2020-08-03 DIAGNOSIS — F41.9 ANXIETY: ICD-10-CM

## 2020-08-03 DIAGNOSIS — F32.1 MODERATE MAJOR DEPRESSION (HCC): ICD-10-CM

## 2020-08-03 DIAGNOSIS — T67.5XXD HEAT EXHAUSTION, SUBSEQUENT ENCOUNTER: ICD-10-CM

## 2020-08-03 DIAGNOSIS — J45.41 MODERATE PERSISTENT ASTHMA WITH ACUTE EXACERBATION: Primary | ICD-10-CM

## 2020-08-03 PROBLEM — Z72.0 TOBACCO ABUSE: Status: RESOLVED | Noted: 2020-07-26 | Resolved: 2020-08-03

## 2020-08-03 PROCEDURE — 99214 OFFICE O/P EST MOD 30 MIN: CPT | Performed by: FAMILY MEDICINE

## 2020-08-03 NOTE — PROGRESS NOTES
PROGRESS NOTE        SUBJECTIVE:  Diagnosis/Chief Complaint: Anxiety      Doing well with mood no  Symptoms still very anxious and depressed but is a bit better  Suicidal: no  Side affects: no  States taking medications per medicine list.no - but just starting new meds buspiorne which she likes stopped venlafaxine   Taking new inhaler works OK  Trying to get in [employer] job. Patient Active Problem List    Diagnosis Date Noted    Asthma, moderate 2020    Tobacco abuse 2020    Anxiety 2020    Moderate major depression ([facility] 2018    Obesity, morbid ([facility] 2018     Current Outpatient Medications   Medication Sig Dispense Refill    busPIRone (BUSPAR) 5 mg tablet Take 1 Tab by mouth two (2) times a day. Start 1 daily 60 Tab 5    albuterol (PROVENTIL VENTOLIN) 2.5 mg /3 mL (0.083 %) nebu 3 mL by Nebulization route every four (4) hours as needed for Wheezing. 50 Each 2    albuterol (VENTOLIN HFA) 90 mcg/actuation inhaler Take  by inhalation.  fluticasone propion-salmeteroL (ADVAIR/WIXELA) 250-50 mcg/dose diskus inhaler Take 1 Puff by inhalation two (2) times a day. 1 Each 5     Allergies   Allergen Reactions    Amoxicillin Itching    Pcn [Penicillins] Rash     Social History     Tobacco Use    Smoking status: Former Smoker     Last attempt to quit: 2020     Years since quittin.0    Smokeless tobacco: Never Used   Substance Use Topics    Alcohol use: No        OBJECTIVE:    . There were no vitals taken for this visit. WDWN in NAD    Psychiatric: Normal mood, judgement    Reviewed: Medications, allergies, clinical lab test results and imaging results have been reviewed. Any abnormal findings have been addressed. ASSESSMENT:       ICD-10-CM ICD-9-CM    1. Moderate persistent asthma with acute exacerbation  J45.41 493.92    2. Moderate major depression (HCC)  F32.1 296.22    3. Anxiety  F41.9 300.00    4. Heat exhaustion, subsequent encounter  T67. 5XXD V58.89 992.5        PLAN    COnt current meds as currently taking  OK off work till 9/1/20    F/Up 6 weeks

## 2020-08-03 NOTE — PROGRESS NOTES
1. Have you been to the ER, urgent care clinic since your last visit? Hospitalized since your last visit? No    2. Have you seen or consulted any other health care providers outside of the [address] [place] [address] since your last visit? Include any pap smears or colon screening.  No     Health Maintenance Due   Topic Date Due    Pneumococcal 0-64 years (1 of 1 - PPSV23) 01/05/2004    HPV Age 9Y-34Y (1 - 2-dose series) 01/05/2009    DTaP/Tdap/Td series (1 - Tdap) 01/05/2019    PAP AKA CERVICAL CYTOLOGY  01/05/2019    Influenza Age 9 to Adult  08/01/2020

## 2020-09-09 ENCOUNTER — VIRTUAL VISIT (OUTPATIENT)
Dept: INTERNAL MEDICINE CLINIC | Age: 20
End: 2020-09-09
Payer: COMMERCIAL

## 2020-09-09 DIAGNOSIS — F41.9 ANXIETY: ICD-10-CM

## 2020-09-09 DIAGNOSIS — F32.1 MODERATE MAJOR DEPRESSION (HCC): Primary | ICD-10-CM

## 2020-09-09 DIAGNOSIS — J45.41 MODERATE PERSISTENT ASTHMA WITH ACUTE EXACERBATION: ICD-10-CM

## 2020-09-09 PROCEDURE — 99214 OFFICE O/P EST MOD 30 MIN: CPT | Performed by: FAMILY MEDICINE

## 2020-09-09 RX ORDER — BUSPIRONE HYDROCHLORIDE 7.5 MG/1
7.5 TABLET ORAL 3 TIMES DAILY
Qty: 90 TAB | Refills: 3 | Status: SHIPPED | OUTPATIENT
Start: 2020-09-09 | End: 2021-11-22 | Stop reason: SINTOL

## 2020-09-09 NOTE — LETTER
9/9/2020 5:00 PM 
 
Ms. Favio Strickland [address] [address] TO WHOM IT MAY CONCERN: 
 
MsJohnathan Hermes ClaudiaJohnathan Da Silva is a patient of mine at [facility]. Ms. Jacqueline Da Silva will be out of work until Thursday, October 1, 2021. Please do not hesitate to contact this office if you have any questions. Sincerely, Peace Dumont MD

## 2020-09-09 NOTE — PROGRESS NOTES
PROGRESS NOTE        SUBJECTIVE:  Diagnosis/Chief Complaint: Anxiety      Doing well with mood no  Symptoms lot on my mind  Suicidal: no  Side affects: no  States taking medications per medicine list.yes - buspirone helps some but Sx still bother her. Also had an asthma attack, taking inhalers and it's better    Patient Active Problem List    Diagnosis Date Noted    Asthma, moderate 2020    Anxiety 2020    Moderate major depression ([facility] [address] [facility] 2018    Obesity, morbid ([facility] [address] [facility] 2018       Allergies   Allergen Reactions    Amoxicillin Itching    Pcn [Penicillins] Rash     Social History     Tobacco Use    Smoking status: Former Smoker     Last attempt to quit: 2020     Years since quittin.1    Smokeless tobacco: Never Used   Substance Use Topics    Alcohol use: No   Works for [employer], trying to get a warehouse job, 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 Missed a lot of work lately due to the problems. OBJECTIVE:    . There were no vitals taken for this visit. WDWN in NAD    Psychiatric: Normal mood, judgement    Reviewed: Medications, allergies, clinical lab test results and imaging results have been reviewed. Any abnormal findings have been addressed. ASSESSMENT:       ICD-10-CM ICD-9-CM    1. Moderate major depression (HCC)  F32.1 296.22    2. Anxiety  F41.9 300.00    3. Moderate persistent asthma with acute exacerbation  J45.41 493.92        PLAN    Orders Placed This Encounter    busPIRone (BUSPAR) 7.5 mg tablet     Sig: Take 1 Tab by mouth three (3) times daily. Dispense:  90 Tab     Refill:  3     Increase buspirone as above    Discussed FMLA. Asthma seems to be doing okay currently. Current Outpatient Medications   Medication Sig Dispense Refill    busPIRone (BUSPAR) 7.5 mg tablet Take 1 Tab by mouth three (3) times daily.  90 Tab 3    fluticasone propion-salmeteroL (ADVAIR/WIXELA) 250-50 mcg/dose diskus inhaler Take 1 Puff by inhalation two (2) times a day. 1 Each 5    albuterol (PROVENTIL VENTOLIN) 2.5 mg /3 mL (0.083 %) nebu 3 mL by Nebulization route every four (4) hours as needed for Wheezing. 50 Each 2    albuterol (VENTOLIN HFA) 90 mcg/actuation inhaler Take  by inhalation.          Follow-up 4 weeks

## 2020-09-09 NOTE — PROGRESS NOTES
Chief Complaint   Patient presents with   [place] Maintenance reviewed. I have reviewed the patient's medical history in detail and updated the computerized patient record. Patient has not been out of the country in (6-7 months), NO diarrhea, NO cough, NO chest conjestion, NO temp. Pt has not been around anyone with these symptoms. Encouraged pt to discuss pt's wishes with spouse/partner/family and bring them in the next appt to follow thru with the Advanced Directive    Fall Risk Assessment, last 12 mths 9/9/2020   Able to walk? Yes   Fall in past 12 months? No       3 most recent PHQ Screens 7/24/2020   Little interest or pleasure in doing things Nearly every day   Feeling down, depressed, irritable, or hopeless Nearly every day   Total Score PHQ 2 6   Trouble falling or staying asleep, or sleeping too much -   Feeling tired or having little energy -   Poor appetite, weight loss, or overeating -   Feeling bad about yourself - or that you are a failure or have let yourself or your family down -   Trouble concentrating on things such as school, work, reading, or watching TV -   Moving or speaking so slowly that other people could have noticed; or the opposite being so fidgety that others notice -   Thoughts of being better off dead, or hurting yourself in some way -   PHQ 9 Score -   How difficult have these problems made it for you to do your work, take care of your home and get along with others -       Abuse Screening Questionnaire 9/9/2020   Do you ever feel afraid of your partner? N   Are you in a relationship with someone who physically or mentally threatens you? N   Is it safe for you to go home?  Y       ADL Assessment 9/9/2020   Feeding yourself No Help Needed   Getting from bed to chair No Help Needed   Getting dressed No Help Needed   Bathing or showering No Help Needed   Walk across the room (includes cane/walker) No Help Needed   Using the telphone No Help Needed   Taking your medications No Help Needed   Preparing meals No Help Needed   Managing money (expenses/bills) No Help Needed   Moderately strenuous housework (laundry) No Help Needed   Shopping for personal items (toiletries/medicines) No Help Needed   Shopping for groceries No Help Needed   Driving No Help Needed   Climbing a flight of stairs No Help Needed   Getting to places beyond walking distances No Help Needed

## 2020-09-26 ENCOUNTER — TELEPHONE (OUTPATIENT)
Dept: INTERNAL MEDICINE CLINIC | Age: 20
End: 2020-09-26

## 2020-09-29 ENCOUNTER — VIRTUAL VISIT (OUTPATIENT)
Dept: INTERNAL MEDICINE CLINIC | Age: 20
End: 2020-09-29
Payer: COMMERCIAL

## 2020-09-29 DIAGNOSIS — J45.20 MILD INTERMITTENT REACTIVE AIRWAY DISEASE WITHOUT COMPLICATION: ICD-10-CM

## 2020-09-29 DIAGNOSIS — K21.9 GASTROESOPHAGEAL REFLUX DISEASE, ESOPHAGITIS PRESENCE NOT SPECIFIED: ICD-10-CM

## 2020-09-29 DIAGNOSIS — F32.1 MODERATE MAJOR DEPRESSION (HCC): ICD-10-CM

## 2020-09-29 DIAGNOSIS — F41.9 ANXIETY: Primary | ICD-10-CM

## 2020-09-29 PROCEDURE — 99214 OFFICE O/P EST MOD 30 MIN: CPT | Performed by: FAMILY MEDICINE

## 2020-09-29 RX ORDER — FAMOTIDINE 40 MG/1
40 TABLET, FILM COATED ORAL DAILY
Qty: 30 TAB | Refills: 5 | Status: SHIPPED | OUTPATIENT
Start: 2020-09-29 | End: 2021-11-22 | Stop reason: ALTCHOICE

## 2020-09-29 NOTE — LETTER
9/29/2020 9:13 AM 
 
Ms. Liang Mcguire [address] [address] RE:  REFERRAL TO BEHAVIORAL HEALTH AND WORK NOTE Enclosed you will find the above named referral and work note. Please do not hesitate to contact this office if you have any questions. Sincerely, Yodit Prater MD

## 2020-09-29 NOTE — PROGRESS NOTES
PROGRESS NOTE        SUBJECTIVE:  Diagnosis/Chief Complaint: Anxiety (F/U)    Trying to get pregnant? Doing well with mood soso  Symptoms same inc buspirone c/o vomiting.still taking buspar but 1 a day only. Anxiety bad  Suicidal: no  Side affects: no  States taking medications per medicine list.no dosage is 1 x daily, dec emotion, crazy thoughts. Still having sl wheezing/ breathing. C/o HB no bleeding    Patient Active Problem List    Diagnosis Date Noted    Asthma, moderate 2020    Anxiety 2020    Moderate major depression ([facility] [address] 2018    Obesity, morbid ([facility] [address] [facility] 2018     Current Outpatient Medications   Medication Sig Dispense Refill    busPIRone (BUSPAR) 7.5 mg tablet Take 1 Tab by mouth three (3) times daily. 90 Tab 3    fluticasone propion-salmeteroL (ADVAIR/WIXELA) 250-50 mcg/dose diskus inhaler Take 1 Puff by inhalation two (2) times a day. 1 Each 5    albuterol (PROVENTIL VENTOLIN) 2.5 mg /3 mL (0.083 %) nebu 3 mL by Nebulization route every four (4) hours as needed for Wheezing. 50 Each 2    albuterol (VENTOLIN HFA) 90 mcg/actuation inhaler Take  by inhalation. Allergies   Allergen Reactions    Amoxicillin Itching    Pcn [Penicillins] Rash     Social History     Tobacco Use    Smoking status: Former Smoker     Last attempt to quit: 2020     Years since quittin.1    Smokeless tobacco: Never Used   Substance Use Topics    Alcohol use: No        OBJECTIVE:    . There were no vitals taken for this visit. WDWN in NAD    Psychiatric: Normal mood, judgement    Reviewed: Medications, allergies, clinical lab test results and imaging results have been reviewed. Any abnormal findings have been addressed. ASSESSMENT:       ICD-10-CM ICD-9-CM    1. Anxiety  F41.9 300.00 REFERRAL TO BEHAVIORAL HEALTH   2. Moderate major depression (HCC)  F32.1 296.22    3. Mild intermittent reactive airway disease without complication  U94.68 533.10    4.  Gastroesophageal reflux disease, esophagitis presence not specified  K21.9 530.81        PLAN    Orders Placed This Encounter    REFERRAL TO BEHAVIORAL HEALTH     Referral Priority:   Routine     Referral Type:   Behavioral Health     Referral Reason:   Specialty Services Required     Referred to Provider:   David Chen LCSW     Number of Visits Requested:   1    famotidine (PEPCID) 40 mg tablet     Sig: Take 1 Tab by mouth daily. Dispense:  30 Tab     Refill:  5     Discussed drugs in pregnancy, she will use protection as opposed to weaning of meds. Rx as above, inc buspar. Discussed possible side affects, precautions, and drug interactions and possible benefits of the medication(s).       1 mo f/u

## 2020-09-29 NOTE — PROGRESS NOTES
Chief Complaint   Patient presents with    Anxiety     F/U     Health Maintenance reviewed. I have reviewed the patient's medical history in detail and updated the computerized patient record. Patient has not been out of the country in (6-7 months), NO diarrhea, NO cough, NO chest conjestion, NO temp. Pt has not been around anyone with these symptoms. 1. Have you been to the ER, urgent care clinic since your last visit? Hospitalized since your last visit?no    2. Have you seen or consulted any other health care providers outside of the [address] [place] [address] since your last visit? Include any pap smears or colon screening. No    Fall Risk Assessment, last 12 mths 9/29/2020   Able to walk? Yes   Fall in past 12 months? No       3 most recent PHQ Screens 9/29/2020   Little interest or pleasure in doing things More than half the days   Feeling down, depressed, irritable, or hopeless More than half the days   Total Score PHQ 2 4   Trouble falling or staying asleep, or sleeping too much -   Feeling tired or having little energy -   Poor appetite, weight loss, or overeating -   Feeling bad about yourself - or that you are a failure or have let yourself or your family down -   Trouble concentrating on things such as school, work, reading, or watching TV -   Moving or speaking so slowly that other people could have noticed; or the opposite being so fidgety that others notice -   Thoughts of being better off dead, or hurting yourself in some way -   PHQ 9 Score -   How difficult have these problems made it for you to do your work, take care of your home and get along with others -       Abuse Screening Questionnaire 9/29/2020   Do you ever feel afraid of your partner? N   Are you in a relationship with someone who physically or mentally threatens you? N   Is it safe for you to go home?  Y       ADL Assessment 9/29/2020   Feeding yourself No Help Needed   Getting from bed to chair No Help Needed   Getting dressed No Help Needed   Bathing or showering No Help Needed   Walk across the room (includes cane/walker) No Help Needed   Using the telphone No Help Needed   Taking your medications No Help Needed   Preparing meals No Help Needed   Managing money (expenses/bills) No Help Needed   Moderately strenuous housework (laundry) No Help Needed   Shopping for personal items (toiletries/medicines) No Help Needed   Shopping for groceries No Help Needed   Driving No Help Needed   Climbing a flight of stairs No Help Needed   Getting to places beyond walking distances No Help Needed

## 2020-09-29 NOTE — LETTER
NOTIFICATION RETURN TO WORK / SCHOOL 
 
9/29/2020 9:09 AM 
 
Ms. Lencho Brooks [address] [address] To Whom It May Concern: 
 
Lnecho Brooks is currently under the care of [address] She will return to work/school on: Friday, October 30, 2020 If there are questions or concerns please have the patient contact our office. Sincerely, Antonia Kong MD

## 2020-10-29 ENCOUNTER — TELEPHONE (OUTPATIENT)
Dept: INTERNAL MEDICINE CLINIC | Age: 20
End: 2020-10-29

## 2021-01-01 NOTE — PROGRESS NOTES
Patient contacted regarding recent discharge and COVID-19 risk. [facility] ED 20 dx-dehydration    Discussed COVID-19 related testing which was not done at this time. Test results were not done. Patient informed of results, if available? N/a  Recent Travel Screening and Travel History documentation     Travel Screening       Question Response     In the last month, have you been in contact with someone who was confirmed or suspected to have Coronavirus / COVID-19? No / Unsure     Do you have any of the following symptoms? None of these     Have you traveled internationally in the last month? No      Travel History   Travel since 20     No documented travel since 20        Spoke to pt who stated she is feeling a lot better. She has an appt already with her PCP. She declined loop enrollment. Care Transition Nurse/ Ambulatory Care Manager contacted the patient by telephone to perform post discharge assessment. Verified name and  with patient as identifiers. Patient has following risk factors of: asthma and morbid obesity. CTN/ACM reviewed discharge instructions, medical action plan and red flags related to discharge diagnosis. Reviewed and educated them on any new and changed medications related to discharge diagnosis. Advised obtaining a 90-day supply of all daily and as-needed medications. Education provided regarding infection prevention, and signs and symptoms of COVID-19 and when to seek medical attention with patient who verbalized understanding. Discussed exposure protocols and quarantine from [address] you at higher risk for severe illness  and given an opportunity for questions and concerns. The patient agrees to contact the COVID-19 hotline [phone] or PCP office for questions related to their healthcare. CTN/ACM provided contact information for future reference. From CDC: Are you at higher risk for severe illness?  Wash your hands often.    Avoid close contact (6 feet, which is about two arm lengths) with people who are sick.  Put distance between yourself and other people if COVID-19 is spreading in your community.  Clean and disinfect frequently touched surfaces.  Avoid all cruise travel and non-essential air travel.  Call your healthcare professional if you have concerns about COVID-19 and your underlying condition or if you are sick. For more information on steps you can take to protect yourself, see CDC's How to Protect Yourself      Patient/family/caregiver given information for [facility] Loop and agrees to enroll no  Patient's preferred e-mail:  n/a  Patient's preferred phone number: n/a  Based on Loop alert triggers, patient will be contacted by nurse care manager for worsening symptoms. Plan for follow-up call in 7-14 days based on severity of symptoms and risk factors. 0.23

## 2021-04-02 ENCOUNTER — VIRTUAL VISIT (OUTPATIENT)
Dept: INTERNAL MEDICINE CLINIC | Age: 21
End: 2021-04-02
Payer: COMMERCIAL

## 2021-04-02 DIAGNOSIS — R09.81 NASAL CONGESTION: ICD-10-CM

## 2021-04-02 DIAGNOSIS — H92.01 EAR PAIN, RIGHT: ICD-10-CM

## 2021-04-02 DIAGNOSIS — J45.41 MODERATE PERSISTENT ASTHMA WITH ACUTE EXACERBATION: ICD-10-CM

## 2021-04-02 DIAGNOSIS — J30.2 SEASONAL ALLERGIC RHINITIS, UNSPECIFIED TRIGGER: Primary | ICD-10-CM

## 2021-04-02 PROCEDURE — 99214 OFFICE O/P EST MOD 30 MIN: CPT | Performed by: NURSE PRACTITIONER

## 2021-04-02 RX ORDER — CETIRIZINE HCL 10 MG
10 TABLET ORAL DAILY
Qty: 30 TAB | Refills: 2 | Status: SHIPPED | OUTPATIENT
Start: 2021-04-02 | End: 2021-07-01

## 2021-04-02 NOTE — PROGRESS NOTES
Chief Complaint   Patient presents with    Nasal Congestion     clear mucus from nose    Ear Pain     right ear pain     Fall Risk Assessment, last 12 mths 9/29/2020   Able to walk? Yes   Fall in past 12 months? No       3 most recent PHQ Screens 4/2/2021   Little interest or pleasure in doing things Several days   Feeling down, depressed, irritable, or hopeless Several days   Total Score PHQ 2 2   Trouble falling or staying asleep, or sleeping too much -   Feeling tired or having little energy -   Poor appetite, weight loss, or overeating -   Feeling bad about yourself - or that you are a failure or have let yourself or your family down -   Trouble concentrating on things such as school, work, reading, or watching TV -   Moving or speaking so slowly that other people could have noticed; or the opposite being so fidgety that others notice -   Thoughts of being better off dead, or hurting yourself in some way -   PHQ 9 Score -   How difficult have these problems made it for you to do your work, take care of your home and get along with others -       Abuse Screening Questionnaire 4/2/2021   Do you ever feel afraid of your partner? N   Are you in a relationship with someone who physically or mentally threatens you? N   Is it safe for you to go home?  Y       ADL Assessment 4/2/2021   Feeding yourself No Help Needed   Getting from bed to chair No Help Needed   Getting dressed No Help Needed   Bathing or showering No Help Needed   Walk across the room (includes cane/walker) No Help Needed   Using the telphone No Help Needed   Taking your medications No Help Needed   Preparing meals No Help Needed   Managing money (expenses/bills) No Help Needed   Moderately strenuous housework (laundry) No Help Needed   Shopping for personal items (toiletries/medicines) No Help Needed   Shopping for groceries No Help Needed   Driving No Help Needed   Climbing a flight of stairs No Help Needed   Getting to places beyond walking distances No Help Needed

## 2021-04-02 NOTE — PROGRESS NOTES
Derik Poon is a 19 y.o. female who was seen by synchronous (real-time) audio-video technology on 4/2/2021 for Nasal Congestion (clear mucus from nose) and Ear Pain (right ear pain)    Chief Complaint   Patient presents with    Nasal Congestion     clear mucus from nose    Ear Pain     right ear pain     Assessment & Plan:   The complexity of medical decision making for this visit is moderate     I spent at least 20 minutes on this visit with this established patient. Subjective:     Derik Poon is a 19 y.o. female who presents with complaint of a \"sinus infection. \" Complained of right Ear otalgia. Reported her ear \" feels clogged. \" Other symptoms include: nasal congestion and difficulty swallowing. Denied fever, chills, shortness of breath or wheezing, chest pain or headaches. Took Cetirizine yesterday which mildly helped. Hx asthma, sinusitis, otitis media, GERD, depression. Denied any Covid exposure. Ear Pain  The history is provided by the patient. This is a recurrent problem. The current episode started yesterday. The problem occurs constantly. The problem has been gradually worsening. Pertinent negatives include no chest pain, no abdominal pain, no headaches and no shortness of breath. Nothing aggravates the symptoms. The symptoms are relieved by medications. The treatment provided mild relief. Unemployed currently. Smokes 6 cigs every other day  Denied marijuana use. Prior to Admission medications    Medication Sig Start Date End Date Taking? Authorizing Provider   busPIRone (BUSPAR) 7.5 mg tablet Take 1 Tab by mouth three (3) times daily. Patient taking differently: Take 7.5 mg by mouth daily. 9/9/20  Yes Blake Aleman MD   fluticasone propion-salmeteroL (ADVAIR/WIXELA) 250-50 mcg/dose diskus inhaler Take 1 Puff by inhalation two (2) times a day.  7/9/20  Yes Blake Aleman MD   albuterol (PROVENTIL VENTOLIN) 2.5 mg /3 mL (0.083 %) nebu 3 mL by Nebulization route every four (4) hours as needed for Wheezing. 6/3/20  Yes Carey Olmstead MD   albuterol (VENTOLIN HFA) 90 mcg/actuation inhaler Take  by inhalation. Yes Provider, Historical   famotidine (PEPCID) 40 mg tablet Take 1 Tab by mouth daily. 20   Carey Olmstead MD     Allergies   Allergen Reactions    Amoxicillin Itching    Pcn [Penicillins] Rash     Past Medical History:   Diagnosis Date    Depression     GERD (gastroesophageal reflux disease)     Headache     Mild asthma 6/3/2020     No past surgical history on file. No family history on file. Social History     Tobacco Use    Smoking status: Former Smoker     Quit date: 2020     Years since quittin.6    Smokeless tobacco: Never Used   Substance Use Topics    Alcohol use: No     ROS negative except noted in HPI.      Objective:     Patient-Reported Vitals 8/3/2020   Patient-Reported LMP 2020        [INSTRUCTIONS:  \"[x]\" Indicates a positive item  \"[]\" Indicates a negative item  -- DELETE ALL ITEMS NOT EXAMINED]    Constitutional: [x] Appears well-developed and well-nourished [x] No apparent distress      [] Abnormal -     Mental status: [x] Alert and awake  [x] Oriented to person/place/time [x] Able to follow commands    [] Abnormal -     Eyes:   EOM    []  Normal    [] Abnormal -   Sclera  [x]  Normal    [] Abnormal -          Discharge [x]  None visible   [] Abnormal -     HENT: [x] Normocephalic, atraumatic  [] Abnormal -   [x] Mouth/Throat: Mucous membranes are moist    External Ears [x] Normal  [] Abnormal -    Neck: [x] No visualized mass [] Abnormal -     Pulmonary/Chest: [x] Respiratory effort normal   [x] No visualized signs of difficulty breathing or respiratory distress; nasal congestion noted      [] Abnormal -      Musculoskeletal:   [] Normal gait with no signs of ataxia         [x] Normal range of motion of neck        [] Abnormal -     Neurological:        [x] No Facial Asymmetry (Cranial nerve 7 motor function) (limited exam due to video visit)          [x] No gaze palsy        [] Abnormal -          Skin:        [x] No significant exanthematous lesions or discoloration noted on facial skin         [] Abnormal -            Psychiatric:       [x] Normal Affect [] Abnormal -        [x] No Hallucinations    Other pertinent observable physical exam findings:- nose-ring left nare    Assessment & Plan:   The complexity of medical decision making for this visit is moderate   DDDX: allergic rhinitis vs sinusitis vs acute viral nasopharyngitis. ICD-10-CM ICD-9-CM    1. Seasonal allergic rhinitis, unspecified trigger  J30.2 477.9 cetirizine (ZYRTEC) 10 mg tablet   2. Nasal congestion  R09.81 478.19 oxymetazoline (Afrin, oxymetazoline,) 0.05 % mist   3. Moderate persistent asthma with acute exacerbation  J45.41 493.92    4. Ear pain, right  H92.01 388.70      1. Nasal congestion  - oxymetazoline (Afrin, oxymetazoline,) 0.05 % mist; 1 Spray by Nasal route two (2) times daily as needed (nasal congestion). Use only for 3 days. Indications: stuffy nose  Dispense: 14.7 mL; Refill: 0    2. Seasonal allergic rhinitis, unspecified trigger  - cetirizine (ZYRTEC) 10 mg tablet; Take 1 Tab by mouth daily for 90 days. Indications: inflammation of the nose due to an allergy  Dispense: 30 Tab; Refill: 2    3. Moderate persistent asthma with acute exacerbation    4. Ear pain, right    Discussed probably has:  DDDX: allergic rhinitis vs sinusitis vs acute viral nasopharyngitis. Discussed eustachian tube and how that can become occluded with URI. Suggested:    Supportive Care  Increase your fluid consumption, especially water. Eat a well-balanced and avoid dairy and sugar as these can increase or thicken congestion. Also advised to use OTC nasal saline 2 sprays each nostril 2-3 times per day to assist with eustachian tube draining. Rest. Do not participate in any vigorous exercise or strenuous activity until you have been feeling better for 24 hours.     Use good handwashing before and after eating. Use hand  if you are in a public place. If you need to cough or sneeze, use the crook of your arm to cover your mouth. If you are not feeling better or you begin to feel worse or develop new symptoms in 3-4 days please call. Gave script for Cetirizine and Oxymetazoline (for 3 days.)  Suggested Vitamin C and Zinc.  Discussed antibiotic resistance and not using for viral infections. Call Monday if symptoms worsen or not feeling better. I spent at least 20 minutes on this visit with this established patient. We discussed the expected course, resolution and complications of the diagnosis(es) in detail. Medication risks, benefits, costs, interactions, and alternatives were discussed as indicated. I advised her to contact the office if her condition worsens, changes or fails to improve as anticipated. She expressed understanding with the diagnosis(es) and plan. Adrielgerhard Josiane, was evaluated through a synchronous (real-time) audio-video encounter. The patient (or guardian if applicable) is aware that this is a billable service. Verbal consent to proceed has been obtained within the past 12 months. The visit was conducted pursuant to the emergency declaration under the [facility], [address] [place] [address] authority and the [employer] and [employer] General Act. Patient identification was verified, and a caregiver was present when appropriate. The patient was located in a state where the provider was credentialed to provide care. I was in the office and pt was at home during visit. If symptoms worsen and  necessary, call 911 or go to nearest ER.        Rhett London NP

## 2021-04-05 ENCOUNTER — VIRTUAL VISIT (OUTPATIENT)
Dept: INTERNAL MEDICINE CLINIC | Age: 21
End: 2021-04-05
Payer: COMMERCIAL

## 2021-04-05 DIAGNOSIS — J01.00 SUBACUTE MAXILLARY SINUSITIS: Primary | ICD-10-CM

## 2021-04-05 PROCEDURE — 99213 OFFICE O/P EST LOW 20 MIN: CPT | Performed by: FAMILY MEDICINE

## 2021-04-05 RX ORDER — AZITHROMYCIN 250 MG/1
250 TABLET, FILM COATED ORAL SEE ADMIN INSTRUCTIONS
Qty: 6 TAB | Refills: 0 | Status: SHIPPED | OUTPATIENT
Start: 2021-04-05 | End: 2021-04-16 | Stop reason: ALTCHOICE

## 2021-04-05 RX ORDER — PREDNISONE 20 MG/1
40 TABLET ORAL
Qty: 10 TAB | Refills: 0 | Status: SHIPPED | OUTPATIENT
Start: 2021-04-05 | End: 2021-04-10

## 2021-04-05 NOTE — PROGRESS NOTES
Subjective:   Lilliam Gillette is a 19 y.o. female who complains of congestion, nasal blockage, cough described as productive of yellow sputum, bilateral sinus pain and right ear pressure for 5 days, gradually improving since that time. Sx day only. She denies a history of fevers, myalgias, rash on body and shortness of breath. Ear better, still congested  Evaluation to date: seen previously and thought to have a viral URI  zyrtec. Treatment to date: antihistamines. Patient does smoke cigarettes. Relevant PMH: Asthma. Allergies   Allergen Reactions    Amoxicillin Itching    Pcn [Penicillins] Rash         Patient Active Problem List    Diagnosis Date Noted    Asthma, moderate 2020    Anxiety 2020    Moderate major depression ([address] 2018    Obesity, morbid ([facility] [address] 2018     No Active Allergies  Past Medical History:   Diagnosis Date    Depression     GERD (gastroesophageal reflux disease)     Headache     Mild asthma 6/3/2020     Social History     Tobacco Use    Smoking status: Former Smoker     Quit date: 2020     Years since quittin.7    Smokeless tobacco: Never Used   Substance Use Topics    Alcohol use: No        Review of Systems  Pertinent items are noted in HPI. Objective: There were no vitals taken for this visit. General:  alert, cooperative, no distress   Eyes: negative   Ears:    Sinuses:    Mouth:     Neck:    Heart:    Lungs:    Abdomen:       Assessment/Plan:   sinusitis and asthma  Suggested symptomatic OTC remedies. Antibiotics per orders. Encounter Diagnoses   Name Primary?  Subacute maxillary sinusitis Yes     Orders Placed This Encounter    predniSONE (DELTASONE) 20 mg tablet    azithromycin (ZITHROMAX) 250 mg tablet   . The patient was counseled on the dangers of tobacco use, and was advised to quit. Reviewed strategies to maximize success, including stress management.     If no better conme in for covid test.

## 2021-04-05 NOTE — PROGRESS NOTES
Chief Complaint   Patient presents with    Ear Pain     no better     Patient is aware that this is a Virtual Visit or Phone Call Only doctor's visit. Patient has not been out of the country in (14 months), NO diarrhea, NO cough, NO chest conjestion, NO temp. Pt has not been around anyone with these symptoms. Health Maintenance reviewed. I have reviewed the patient's medical history in detail and updated the computerized patient record. 1. Have you been to the ER, urgent care clinic since your last visit? Hospitalized since your last visit? no    2. Have you seen or consulted any other health care providers outside of the [address] [place] [address] since your last visit? No  Include any pap smears or colon screening. Encouraged pt to discuss pt's wishes with spouse/partner/family and bring them in the next appt to follow thru with the Advanced Directive      Fall Risk Assessment, last 12 mths 9/29/2020   Able to walk? Yes   Fall in past 12 months? No       3 most recent PHQ Screens 4/5/2021   Little interest or pleasure in doing things Several days   Feeling down, depressed, irritable, or hopeless Several days   Total Score PHQ 2 2   Trouble falling or staying asleep, or sleeping too much -   Feeling tired or having little energy -   Poor appetite, weight loss, or overeating -   Feeling bad about yourself - or that you are a failure or have let yourself or your family down -   Trouble concentrating on things such as school, work, reading, or watching TV -   Moving or speaking so slowly that other people could have noticed; or the opposite being so fidgety that others notice -   Thoughts of being better off dead, or hurting yourself in some way -   PHQ 9 Score -   How difficult have these problems made it for you to do your work, take care of your home and get along with others -       Abuse Screening Questionnaire 4/5/2021   Do you ever feel afraid of your partner?  N   Are you in a relationship with someone who physically or mentally threatens you? N   Is it safe for you to go home?  Y       ADL Assessment 4/5/2021   Feeding yourself No Help Needed   Getting from bed to chair No Help Needed   Getting dressed No Help Needed   Bathing or showering No Help Needed   Walk across the room (includes cane/walker) No Help Needed   Using the telphone No Help Needed   Taking your medications No Help Needed   Preparing meals No Help Needed   Managing money (expenses/bills) No Help Needed   Moderately strenuous housework (laundry) No Help Needed   Shopping for personal items (toiletries/medicines) No Help Needed   Shopping for groceries No Help Needed   Driving No Help Needed   Climbing a flight of stairs No Help Needed   Getting to places beyond walking distances No Help Needed

## 2021-04-16 ENCOUNTER — VIRTUAL VISIT (OUTPATIENT)
Dept: INTERNAL MEDICINE CLINIC | Age: 21
End: 2021-04-16
Payer: COMMERCIAL

## 2021-04-16 DIAGNOSIS — R11.0 NAUSEA: ICD-10-CM

## 2021-04-16 DIAGNOSIS — F33.1 DEPRESSION, MAJOR, RECURRENT, MODERATE (HCC): Primary | ICD-10-CM

## 2021-04-16 PROCEDURE — 99214 OFFICE O/P EST MOD 30 MIN: CPT | Performed by: FAMILY MEDICINE

## 2021-04-16 RX ORDER — ONDANSETRON 4 MG/1
4 TABLET, ORALLY DISINTEGRATING ORAL
Qty: 12 TAB | Refills: 0 | Status: SHIPPED | OUTPATIENT
Start: 2021-04-16 | End: 2021-11-22 | Stop reason: ALTCHOICE

## 2021-04-16 NOTE — PROGRESS NOTES
PROGRESS NOTE        SUBJECTIVE:  Diagnosis/Chief Complaint: Advice Only (pt wants to talk about counseling )      Doing well with mood so so  Symptoms see phq9  Suicidal: no  Side affects: no  States taking medications per medicine list.yes - as RX  Maybe occa nausea, not prgnant    Patient Active Problem List    Diagnosis Date Noted    Asthma, moderate 2020    Anxiety 2020    Moderate major depression ([facility] [address] 2018    Obesity, morbid ([facility] [address] 2018     Current Outpatient Medications   Medication Sig Dispense Refill    ondansetron (ZOFRAN ODT) 4 mg disintegrating tablet Take 1 Tab by mouth every eight (8) hours as needed for Nausea. 12 Tab 0    oxymetazoline (Afrin, oxymetazoline,) 0.05 % mist 1 Spray by Nasal route two (2) times daily as needed (nasal congestion). Use only for 3 days. Indications: stuffy nose 14.7 mL 0    cetirizine (ZYRTEC) 10 mg tablet Take 1 Tab by mouth daily for 90 days. Indications: inflammation of the nose due to an allergy 30 Tab 2    famotidine (PEPCID) 40 mg tablet Take 1 Tab by mouth daily. 30 Tab 5    busPIRone (BUSPAR) 7.5 mg tablet Take 1 Tab by mouth three (3) times daily. (Patient taking differently: Take 7.5 mg by mouth daily.) 90 Tab 3    fluticasone propion-salmeteroL (ADVAIR/WIXELA) 250-50 mcg/dose diskus inhaler Take 1 Puff by inhalation two (2) times a day. 1 Each 5    albuterol (PROVENTIL VENTOLIN) 2.5 mg /3 mL (0.083 %) nebu 3 mL by Nebulization route every four (4) hours as needed for Wheezing. 50 Each 2    albuterol (VENTOLIN HFA) 90 mcg/actuation inhaler Take  by inhalation. No Active Allergies  Past Medical History:   Diagnosis Date    Depression     GERD (gastroesophageal reflux disease)     Headache     Mild asthma 6/3/2020     Social History     Tobacco Use    Smoking status: Former Smoker     Quit date: 2020     Years since quittin.7    Smokeless tobacco: Never Used   Substance Use Topics    Alcohol use:  No OBJECTIVE:    . There were no vitals taken for this visit. WDWN in NAD  Neurological exam[de-identified] 2-12 intact  Psychiatric: Normal mood, judgement    Reviewed: Medications, allergies, clinical lab test results and imaging results have been reviewed. Any abnormal findings have been addressed. ASSESSMENT:       ICD-10-CM ICD-9-CM    1. Depression, major, recurrent, moderate (HCC)  F33.1 296.32 AMB SUPPLY ORDER   2. Nausea  R11.0 787.02 ondansetron (ZOFRAN ODT) 4 mg disintegrating tablet       PLAN    Orders Placed This Encounter    AMB SUPPLY ORDER     Counseling for anxiety/depression, currently on buspirone. Ray Powers     ondansetron (ZOFRAN ODT) 4 mg disintegrating tablet     Sig: Take 1 Tab by mouth every eight (8) hours as needed for Nausea. Dispense:  12 Tab     Refill:  0     Leonradha Gonzalez, who was evaluated through a synchronous (real-time) audio-video encounter, and/or her healthcare decision maker, is aware that it is a billable service, with coverage as determined by her insurance carrier. She provided verbal consent to proceed: Yes, and patient identification was verified. This visit was conducted pursuant to the emergency declaration under the [address] [place] [address] authority and the [employer] General Act. A caregiver was present when appropriate. Ability to conduct physical exam was limited. The patient was located in a state where the provider was credentialed to provide care.      --Thuan Mederos MD on 4/24/2021 at 8:46 AM

## 2021-04-16 NOTE — PROGRESS NOTES
Chief Complaint   Patient presents with    Advice Only     pt wants to talk about counseling        Patient is aware that this is a Virtual Visit or Phone Call Only doctor's visit. Patient has not been out of the country in (14 months), NO diarrhea, NO cough, NO chest conjestion, NO temp. Pt has not been around anyone with these symptoms. Health Maintenance reviewed. I have reviewed the patient's medical history in detail and updated the computerized patient record. 1. Have you been to the ER, urgent care clinic since your last visit? Hospitalized since your last visit? 2.  Have you seen or consulted any other health care providers outside of the [address] [place] [address] since your last visit? Include any pap smears or colon screening. Encouraged pt to discuss pt's wishes with spouse/partner/family and bring them in the next appt to follow thru with the Advanced Directive      Fall Risk Assessment, last 12 mths 9/29/2020   Able to walk? Yes   Fall in past 12 months? No       3 most recent PHQ Screens 4/16/2021   Little interest or pleasure in doing things Several days   Feeling down, depressed, irritable, or hopeless Several days   Total Score PHQ 2 2   Trouble falling or staying asleep, or sleeping too much -   Feeling tired or having little energy -   Poor appetite, weight loss, or overeating -   Feeling bad about yourself - or that you are a failure or have let yourself or your family down -   Trouble concentrating on things such as school, work, reading, or watching TV -   Moving or speaking so slowly that other people could have noticed; or the opposite being so fidgety that others notice -   Thoughts of being better off dead, or hurting yourself in some way -   PHQ 9 Score -   How difficult have these problems made it for you to do your work, take care of your home and get along with others -       Abuse Screening Questionnaire 4/16/2021   Do you ever feel afraid of your partner?  Fillmore Nomi Are you in a relationship with someone who physically or mentally threatens you? N   Is it safe for you to go home?  Y       ADL Assessment 4/16/2021   Feeding yourself No Help Needed   Getting from bed to chair No Help Needed   Getting dressed No Help Needed   Bathing or showering No Help Needed   Walk across the room (includes cane/walker) No Help Needed   Using the telphone No Help Needed   Taking your medications No Help Needed   Preparing meals No Help Needed   Managing money (expenses/bills) No Help Needed   Moderately strenuous housework (laundry) No Help Needed   Shopping for personal items (toiletries/medicines) No Help Needed   Shopping for groceries No Help Needed   Driving No Help Needed   Climbing a flight of stairs No Help Needed   Getting to places beyond walking distances No Help Needed

## 2021-06-14 ENCOUNTER — VIRTUAL VISIT (OUTPATIENT)
Dept: INTERNAL MEDICINE CLINIC | Age: 21
End: 2021-06-14
Payer: COMMERCIAL

## 2021-06-14 DIAGNOSIS — F33.1 DEPRESSION, MAJOR, RECURRENT, MODERATE (HCC): ICD-10-CM

## 2021-06-14 DIAGNOSIS — R50.9 FEVER, UNSPECIFIED FEVER CAUSE: ICD-10-CM

## 2021-06-14 DIAGNOSIS — J45.41 MODERATE PERSISTENT ASTHMA WITH ACUTE EXACERBATION: Primary | ICD-10-CM

## 2021-06-14 PROCEDURE — 99214 OFFICE O/P EST MOD 30 MIN: CPT | Performed by: FAMILY MEDICINE

## 2021-06-14 RX ORDER — PREDNISONE 20 MG/1
40 TABLET ORAL
Qty: 10 TABLET | Refills: 0 | Status: SHIPPED | OUTPATIENT
Start: 2021-06-14 | End: 2021-06-19

## 2021-06-14 NOTE — PROGRESS NOTES
Chief Complaint   Patient presents with    Fever    Cold Symptoms     Virtual Visit [phone]    Pt states she has been having fever, loss of smell and taste and cold symptoms since Thursday, has not been tested for Covid. 1. Have you been to the ER, urgent care clinic since your last visit? Hospitalized since your last visit? No    2. Have you seen or consulted any other health care providers outside of the [address] [place] [address] since your last visit? Include any pap smears or colon screening.  No

## 2021-06-14 NOTE — PROGRESS NOTES
Subjective:   Nathan Walker is a 19 y.o. female who complains of congestion, cough described as productive of yellow sputum, fever 101.5 and loss of smell taste  for 4-5 days, gradually worsening since that time. She denies a history of ear or throat pain rash. Patient has no known exposures to anyone with coronavirus infection. There has been no travel to the infected countries of [place], [place], [place], [place], or [place], recently. Patient does not live in assisted living or nursing facility. Previous episode resolved with predniosone    Evaluation to date: none. Treatment to date: OTC products. Patient does not smoke cigarettes. Relevant PMH: No pertinent additional PMH. No Known Allergies      Patient Active Problem List    Diagnosis Date Noted    Asthma, moderate 2020    Anxiety 2020    Moderate major depression ([facility] [address] 2018    Obesity, morbid ([facility] [address] 2018     No Known Allergies  Social History     Tobacco Use    Smoking status: Former Smoker     Quit date: 2020     Years since quittin.9    Smokeless tobacco: Never Used   Substance Use Topics    Alcohol use: No        Review of Systems  Pertinent items are noted in HPI. Objective: There were no vitals taken for this visit. General:  alert, cooperative, no distress   Eyes: negative   Ears:    Sinuses:    Mouth:     Neck:    Heart:    Lungs:    Abdomen:       Assessment/Plan:   bronchitis and asthma  RTC prn. ICD-10-CM ICD-9-CM    1. Moderate persistent asthma with acute exacerbation  J45.41 493.92    2. Fever, unspecified fever cause  R50.9 780.60 AMB POC SARS-COV-2   3. Depression, major, recurrent, moderate (HCC)  F33.1 296.32        Orders Placed This Encounter    AMB POC SARS-COV-2    predniSONE (DELTASONE) 20 mg tablet   . Poss Covid come in for testing      Patient was instructed on the limits of making a diagnosis at this visit.   Was instructed to call us or go to the emergency room if the symptoms increased or if new symptoms appeared.     F/u in am for testing

## 2021-06-22 ENCOUNTER — HOSPITAL ENCOUNTER (EMERGENCY)
Age: 21
Discharge: HOME | End: 2021-06-22
Attending: EMERGENCY MEDICINE
Payer: MEDICAID

## 2021-06-22 VITALS
OXYGEN SATURATION: 100 % | HEIGHT: 62 IN | SYSTOLIC BLOOD PRESSURE: 120 MMHG | RESPIRATION RATE: 16 BRPM | HEART RATE: 64 BPM | WEIGHT: 176.59 LBS | DIASTOLIC BLOOD PRESSURE: 81 MMHG | BODY MASS INDEX: 32.5 KG/M2 | TEMPERATURE: 98.2 F

## 2021-06-22 DIAGNOSIS — R42 DIZZINESS: Primary | ICD-10-CM

## 2021-06-22 DIAGNOSIS — R51.9 NONINTRACTABLE EPISODIC HEADACHE, UNSPECIFIED HEADACHE TYPE: ICD-10-CM

## 2021-06-22 LAB
ALBUMIN SERPL-MCNC: 3.7 G/DL (ref 3.5–5)
ALBUMIN/GLOB SERPL: 0.9 {RATIO} (ref 1.1–2.2)
ALP SERPL-CCNC: 53 U/L (ref 45–117)
ALT SERPL-CCNC: 26 U/L (ref 12–78)
ANION GAP SERPL CALC-SCNC: 4 MMOL/L (ref 5–15)
APPEARANCE UR: ABNORMAL
AST SERPL-CCNC: 16 U/L (ref 15–37)
BACTERIA URNS QL MICRO: NEGATIVE /HPF
BASOPHILS # BLD: 0 K/UL (ref 0–0.1)
BASOPHILS NFR BLD: 1 % (ref 0–1)
BILIRUB SERPL-MCNC: 0.3 MG/DL (ref 0.2–1)
BILIRUB UR QL: NEGATIVE
BUN SERPL-MCNC: 7 MG/DL (ref 6–20)
BUN/CREAT SERPL: 10 (ref 12–20)
CALCIUM SERPL-MCNC: 8.9 MG/DL (ref 8.5–10.1)
CHLORIDE SERPL-SCNC: 105 MMOL/L (ref 97–108)
CO2 SERPL-SCNC: 29 MMOL/L (ref 21–32)
COLOR UR: ABNORMAL
CREAT SERPL-MCNC: 0.7 MG/DL (ref 0.55–1.02)
DIFFERENTIAL METHOD BLD: ABNORMAL
EOSINOPHIL # BLD: 0.2 K/UL (ref 0–0.4)
EOSINOPHIL NFR BLD: 3 % (ref 0–7)
EPITH CASTS URNS QL MICRO: ABNORMAL /LPF
ERYTHROCYTE [DISTWIDTH] IN BLOOD BY AUTOMATED COUNT: 12.7 % (ref 11.5–14.5)
GLOBULIN SER CALC-MCNC: 4.1 G/DL (ref 2–4)
GLUCOSE SERPL-MCNC: 90 MG/DL (ref 65–100)
GLUCOSE UR STRIP.AUTO-MCNC: NEGATIVE MG/DL
HCG UR QL: NEGATIVE
HCT VFR BLD AUTO: 41.3 % (ref 35–47)
HGB BLD-MCNC: 13.6 G/DL (ref 11.5–16)
HGB UR QL STRIP: ABNORMAL
IMM GRANULOCYTES # BLD AUTO: 0 K/UL (ref 0–0.04)
IMM GRANULOCYTES NFR BLD AUTO: 0 % (ref 0–0.5)
KETONES UR QL STRIP.AUTO: NEGATIVE MG/DL
LEUKOCYTE ESTERASE UR QL STRIP.AUTO: NEGATIVE
LYMPHOCYTES # BLD: 3.5 K/UL (ref 0.8–3.5)
LYMPHOCYTES NFR BLD: 65 % (ref 12–49)
MCH RBC QN AUTO: 29.7 PG (ref 26–34)
MCHC RBC AUTO-ENTMCNC: 32.9 G/DL (ref 30–36.5)
MCV RBC AUTO: 90.2 FL (ref 80–99)
MONOCYTES # BLD: 0.5 K/UL (ref 0–1)
MONOCYTES NFR BLD: 9 % (ref 5–13)
NEUTS SEG # BLD: 1.2 K/UL (ref 1.8–8)
NEUTS SEG NFR BLD: 22 % (ref 32–75)
NITRITE UR QL STRIP.AUTO: NEGATIVE
NRBC # BLD: 0 K/UL (ref 0–0.01)
NRBC BLD-RTO: 0 PER 100 WBC
PH UR STRIP: 7.5 [PH] (ref 5–8)
PLATELET # BLD AUTO: 271 K/UL (ref 150–400)
PMV BLD AUTO: 10.2 FL (ref 8.9–12.9)
POTASSIUM SERPL-SCNC: 3.5 MMOL/L (ref 3.5–5.1)
PROT SERPL-MCNC: 7.8 G/DL (ref 6.4–8.2)
PROT UR STRIP-MCNC: NEGATIVE MG/DL
RBC # BLD AUTO: 4.58 M/UL (ref 3.8–5.2)
RBC #/AREA URNS HPF: ABNORMAL /HPF (ref 0–5)
SODIUM SERPL-SCNC: 138 MMOL/L (ref 136–145)
SP GR UR REFRACTOMETRY: 1.01 (ref 1–1.03)
UA: UC IF INDICATED,UAUC: ABNORMAL
UROBILINOGEN UR QL STRIP.AUTO: 1 EU/DL (ref 0.2–1)
WBC # BLD AUTO: 5.4 K/UL (ref 3.6–11)
WBC URNS QL MICRO: ABNORMAL /HPF (ref 0–4)

## 2021-06-22 PROCEDURE — 80053 COMPREHEN METABOLIC PANEL: CPT

## 2021-06-22 PROCEDURE — 81025 URINE PREGNANCY TEST: CPT

## 2021-06-22 PROCEDURE — 81001 URINALYSIS AUTO W/SCOPE: CPT

## 2021-06-22 PROCEDURE — 74011250636 HC RX REV CODE- 250/636: Performed by: EMERGENCY MEDICINE

## 2021-06-22 PROCEDURE — 96374 THER/PROPH/DIAG INJ IV PUSH: CPT

## 2021-06-22 PROCEDURE — 96375 TX/PRO/DX INJ NEW DRUG ADDON: CPT

## 2021-06-22 PROCEDURE — 36415 COLL VENOUS BLD VENIPUNCTURE: CPT

## 2021-06-22 PROCEDURE — 85025 COMPLETE CBC W/AUTO DIFF WBC: CPT

## 2021-06-22 PROCEDURE — 99285 EMERGENCY DEPT VISIT HI MDM: CPT

## 2021-06-22 PROCEDURE — 93005 ELECTROCARDIOGRAM TRACING: CPT

## 2021-06-22 RX ORDER — MECLIZINE HYDROCHLORIDE 25 MG/1
25 TABLET ORAL
Qty: 21 TABLET | Refills: 0 | Status: SHIPPED | OUTPATIENT
Start: 2021-06-22 | End: 2021-07-02

## 2021-06-22 RX ORDER — ONDANSETRON 2 MG/ML
4 INJECTION INTRAMUSCULAR; INTRAVENOUS ONCE
Status: COMPLETED | OUTPATIENT
Start: 2021-06-22 | End: 2021-06-22

## 2021-06-22 RX ORDER — MECLIZINE HCL 12.5 MG 12.5 MG/1
25 TABLET ORAL
Status: COMPLETED | OUTPATIENT
Start: 2021-06-22 | End: 2021-06-22

## 2021-06-22 RX ORDER — KETOROLAC TROMETHAMINE 30 MG/ML
30 INJECTION, SOLUTION INTRAMUSCULAR; INTRAVENOUS
Status: COMPLETED | OUTPATIENT
Start: 2021-06-22 | End: 2021-06-22

## 2021-06-22 RX ORDER — ONDANSETRON 4 MG/1
4 TABLET, ORALLY DISINTEGRATING ORAL
Qty: 20 TABLET | Refills: 0 | Status: SHIPPED | OUTPATIENT
Start: 2021-06-22 | End: 2021-11-22 | Stop reason: ALTCHOICE

## 2021-06-22 RX ADMIN — ONDANSETRON 4 MG: 2 INJECTION INTRAMUSCULAR; INTRAVENOUS at 13:56

## 2021-06-22 RX ADMIN — MECLIZINE 25 MG: 12.5 TABLET ORAL at 13:56

## 2021-06-22 RX ADMIN — SODIUM CHLORIDE 1000 ML: 9 INJECTION, SOLUTION INTRAVENOUS at 13:57

## 2021-06-22 RX ADMIN — KETOROLAC TROMETHAMINE 30 MG: 30 INJECTION, SOLUTION INTRAMUSCULAR; INTRAVENOUS at 13:55

## 2021-06-22 NOTE — ED NOTES
Patient discharged by Dr. Santhosh Smith. Patient provided with discharge instructions Rx and instructions on follow up care. Patient out of ED ambulatory accompanied by family.

## 2021-06-22 NOTE — ED NOTES
Reports gradually worsening dizziness that began Thursday with severe headache, felt she could no longer go to work. Reports recently getting over sinus infection, poor appetite/PO intake recently.  Hx migraines

## 2021-06-22 NOTE — DISCHARGE INSTRUCTIONS
Please follow-up with your primary care doctor. Return for worsening symptoms anytime. You can use the meclizine for dizziness and Zofran for nausea.

## 2021-06-22 NOTE — ED PROVIDER NOTES
EMERGENCY DEPARTMENT HISTORY AND PHYSICAL EXAM      Date: 6/22/2021  Patient Name: Carissa Gonzalez    History of Presenting Illness     Chief Complaint   Patient presents with    Dizziness     sx started on saturday. pt noted dizziness when changing positions. pt vomited twice yesterday. headache started this morning. initially on right side, now in forehead    Headache       History Provided By: Patient    HPI: Carissa Gonzalez, 19 y.o. female presents to the ED with cc of dizziness. 67-year-old female with a past medical history of GERD, headaches presents emergency department with a chief complaint of dizziness. Patient reports symptoms began Saturday. 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 She reports she ate and sat down with some improvement. Patient reports over the past 2 days she has had intermittent episodes of dizziness. She describes this as lightheadedness and feeling as though she is \"dehydrated. \"  She reports a feeling of the room spinning, worse with movement. Improves with rest and lying flat. Denies any difficulty with speech, diplopia, paresthesias or weakness in her arms or legs. Endorses nausea, no vomiting. Patient does report 2 days of intermittent frontal headache that is described as a \"squeezing\" reports a history of headaches, and this feels similar to her prior headaches. Reports symptoms are made worse by light. There are no other complaints, changes, or physical findings at this time. PCP: Olinda Warner MD    No current facility-administered medications on file prior to encounter. Current Outpatient Medications on File Prior to Encounter   Medication Sig Dispense Refill    ondansetron (ZOFRAN ODT) 4 mg disintegrating tablet Take 1 Tab by mouth every eight (8) hours as needed for Nausea. 12 Tab 0    oxymetazoline (Afrin, oxymetazoline,) 0.05 % mist 1 Spray by Nasal route two (2) times daily as needed (nasal congestion).  Use only for 3 days. Indications: stuffy nose 14.7 mL 0    cetirizine (ZYRTEC) 10 mg tablet Take 1 Tab by mouth daily for 90 days. Indications: inflammation of the nose due to an allergy (Patient not taking: Reported on 2021) 30 Tab 2    famotidine (PEPCID) 40 mg tablet Take 1 Tab by mouth daily. 30 Tab 5    busPIRone (BUSPAR) 7.5 mg tablet Take 1 Tab by mouth three (3) times daily. (Patient taking differently: Take 7.5 mg by mouth daily.) 90 Tab 3    fluticasone propion-salmeteroL (ADVAIR/WIXELA) 250-50 mcg/dose diskus inhaler Take 1 Puff by inhalation two (2) times a day. 1 Each 5    albuterol (PROVENTIL VENTOLIN) 2.5 mg /3 mL (0.083 %) nebu 3 mL by Nebulization route every four (4) hours as needed for Wheezing. 50 Each 2    albuterol (VENTOLIN HFA) 90 mcg/actuation inhaler Take  by inhalation. Past History     Past Medical History:  Past Medical History:   Diagnosis Date    Depression     GERD (gastroesophageal reflux disease)     Headache     Mild asthma 6/3/2020       Past Surgical History:  No past surgical history on file. Family History:  History reviewed. No pertinent family history. Social History:  Social History     Tobacco Use    Smoking status: Former Smoker     Quit date: 2020     Years since quittin.9    Smokeless tobacco: Never Used   Substance Use Topics    Alcohol use: No    Drug use: No       Allergies:  No Known Allergies      Review of Systems   Review of Systems   Constitutional: Negative for activity change, chills and fever. HENT: Negative for facial swelling and voice change. Eyes: Negative for redness. Respiratory: Negative for cough, shortness of breath and wheezing. Cardiovascular: Negative for chest pain and leg swelling. Gastrointestinal: Positive for nausea. Negative for abdominal pain, diarrhea and vomiting. Genitourinary: Negative for decreased urine volume. Musculoskeletal: Negative for gait problem.    Skin: Negative for pallor and rash. Neurological: Positive for dizziness, light-headedness and headaches. Negative for tremors, seizures, facial asymmetry, speech difficulty and weakness. Psychiatric/Behavioral: Negative for agitation. All other systems reviewed and are negative. Physical Exam   Physical Exam  Vitals and nursing note reviewed. Constitutional:       Comments: 64-year-old female, resting in bed, no acute distress   HENT:      Head: Normocephalic and atraumatic. Cardiovascular:      Rate and Rhythm: Normal rate and regular rhythm. Heart sounds: No murmur heard. No friction rub. No gallop. Pulmonary:      Effort: Pulmonary effort is normal.      Breath sounds: Normal breath sounds. Abdominal:      Palpations: Abdomen is soft. Tenderness: There is no abdominal tenderness. Musculoskeletal:         General: Normal range of motion. Cervical back: Normal range of motion. No rigidity. Skin:     General: Skin is warm. Capillary Refill: Capillary refill takes less than 2 seconds. Neurological:      General: No focal deficit present. Mental Status: She is alert. Mental status is at baseline. Cranial Nerves: No cranial nerve deficit. Sensory: No sensory deficit. Motor: No weakness.       Coordination: Coordination normal.   Psychiatric:         Mood and Affect: Mood normal.         Diagnostic Study Results     Labs -     Recent Results (from the past 12 hour(s))   EKG, 12 LEAD, INITIAL    Collection Time: 06/22/21 12:43 PM   Result Value Ref Range    Ventricular Rate 56 BPM    Atrial Rate 56 BPM    P-R Interval 124 ms    QRS Duration 94 ms    Q-T Interval 424 ms    QTC Calculation (Bezet) 409 ms    Calculated P Axis 39 degrees    Calculated R Axis 61 degrees    Calculated T Axis 63 degrees    Diagnosis       Sinus bradycardia  When compared with ECG of 24-SEP-2012 12:31,  PREVIOUS ECG IS PRESENT     HCG URINE, QL. - POC    Collection Time: 06/22/21  1:12 PM   Result Value Ref Range    Pregnancy test,urine (POC) Negative NEG     URINALYSIS W/ REFLEX CULTURE    Collection Time: 06/22/21  1:13 PM    Specimen: Urine   Result Value Ref Range    Color YELLOW/STRAW      Appearance CLOUDY (A) CLEAR      Specific gravity 1.007 1.003 - 1.030      pH (UA) 7.5 5.0 - 8.0      Protein Negative NEG mg/dL    Glucose Negative NEG mg/dL    Ketone Negative NEG mg/dL    Bilirubin Negative NEG      Blood SMALL (A) NEG      Urobilinogen 1.0 0.2 - 1.0 EU/dL    Nitrites Negative NEG      Leukocyte Esterase Negative NEG      WBC 0-4 0 - 4 /hpf    RBC 0-5 0 - 5 /hpf    Epithelial cells FEW FEW /lpf    Bacteria Negative NEG /hpf    UA:UC IF INDICATED CULTURE NOT INDICATED BY UA RESULT CNI     CBC WITH AUTOMATED DIFF    Collection Time: 06/22/21  1:13 PM   Result Value Ref Range    WBC 5.4 3.6 - 11.0 K/uL    RBC 4.58 3.80 - 5.20 M/uL    HGB 13.6 11.5 - 16.0 g/dL    HCT 41.3 35.0 - 47.0 %    MCV 90.2 80.0 - 99.0 FL    MCH 29.7 26.0 - 34.0 PG    MCHC 32.9 30.0 - 36.5 g/dL    RDW 12.7 11.5 - 14.5 %    PLATELET 638 415 - 207 K/uL    MPV 10.2 8.9 - 12.9 FL    NRBC 0.0 0  WBC    ABSOLUTE NRBC 0.00 0.00 - 0.01 K/uL    NEUTROPHILS 22 (L) 32 - 75 %    LYMPHOCYTES 65 (H) 12 - 49 %    MONOCYTES 9 5 - 13 %    EOSINOPHILS 3 0 - 7 %    BASOPHILS 1 0 - 1 %    IMMATURE GRANULOCYTES 0 0.0 - 0.5 %    ABS. NEUTROPHILS 1.2 (L) 1.8 - 8.0 K/UL    ABS. LYMPHOCYTES 3.5 0.8 - 3.5 K/UL    ABS. MONOCYTES 0.5 0.0 - 1.0 K/UL    ABS. EOSINOPHILS 0.2 0.0 - 0.4 K/UL    ABS. BASOPHILS 0.0 0.0 - 0.1 K/UL    ABS. IMM.  GRANS. 0.0 0.00 - 0.04 K/UL    DF AUTOMATED     METABOLIC PANEL, COMPREHENSIVE    Collection Time: 06/22/21  1:13 PM   Result Value Ref Range    Sodium 138 136 - 145 mmol/L    Potassium 3.5 3.5 - 5.1 mmol/L    Chloride 105 97 - 108 mmol/L    CO2 29 21 - 32 mmol/L    Anion gap 4 (L) 5 - 15 mmol/L    Glucose 90 65 - 100 mg/dL    BUN 7 6 - 20 MG/DL    Creatinine 0.70 0.55 - 1.02 MG/DL    BUN/Creatinine ratio 10 (L) 12 - 20 GFR est AA >60 >60 ml/min/1.73m2    GFR est non-AA >60 >60 ml/min/1.73m2    Calcium 8.9 8.5 - 10.1 MG/DL    Bilirubin, total 0.3 0.2 - 1.0 MG/DL    ALT (SGPT) 26 12 - 78 U/L    AST (SGOT) 16 15 - 37 U/L    Alk. phosphatase 53 45 - 117 U/L    Protein, total 7.8 6.4 - 8.2 g/dL    Albumin 3.7 3.5 - 5.0 g/dL    Globulin 4.1 (H) 2.0 - 4.0 g/dL    A-G Ratio 0.9 (L) 1.1 - 2.2         Radiologic Studies -   No orders to display     CT Results  (Last 48 hours)    None        CXR Results  (Last 48 hours)    None          Medical Decision Making   I am the first provider for this patient. I reviewed the vital signs, available nursing notes, past medical history, past surgical history, family history and social history. Vital Signs-Reviewed the patient's vital signs. Patient Vitals for the past 12 hrs:   Temp Pulse Resp BP SpO2   06/22/21 1430    120/81 100 %   06/22/21 1402    117/86 99 %   06/22/21 1330    105/82 98 %   06/22/21 1315    116/77 100 %   06/22/21 1307    115/85    06/22/21 1239 98.2 °F (36.8 °C) 64 16 110/67 100 %       Records Reviewed: Nursing Notes and Old Medical Records    Provider Notes (Medical Decision Making):     17-year-old female presents emergency department with dizziness and headache. Regarding patient's dizziness, appears quite well. Given the positional nature I considered peripheral vertigo, but it is improved laying flat, she has no provocative symptoms on [place] testing. I am actually more suspicious for hypovolemia and near syncope. EKG is unremarkable. Repeat labs sent from triage will check urine and pregnancy. I do not think this is a central stroke given no appreciable neuro deficits. Regarding headache, benign exam, history of headaches and reports this feels similar. No history to sports SAH, meningitis or space-occupying lesion. ED Course:   Initial assessment performed.  The patients presenting problems have been discussed, and they are in agreement with the care plan formulated and outlined with them. I have encouraged them to ask questions as they arise throughout their visit. ED Course as of Jun 22 2117   Tue Jun 22, 2021   1320 Preliminary EKG interpreted by me. Shows sinus bradycardia with a HR of 56. No ST elevations or depressions concerning for ischemia. Normal intervals. [MB]   9921 Labs are reassuring.    [MB]   3015 Reassessed, improving symptoms, fluids infusing    [MB]      ED Course User Index  [MB] MD Harley Hart MD      Disposition:    Discharged      DISCHARGE PLAN:  1. Discharge Medication List as of 6/22/2021  3:44 PM      START taking these medications    Details   meclizine (ANTIVERT) 25 mg tablet Take 1 Tablet by mouth three (3) times daily as needed for Dizziness for up to 10 days. , Normal, Disp-21 Tablet, R-0      !! ondansetron (Zofran ODT) 4 mg disintegrating tablet 1 Tablet by SubLINGual route every eight (8) hours as needed for Nausea or Vomiting., Normal, Disp-20 Tablet, R-0       !! - Potential duplicate medications found. Please discuss with provider. CONTINUE these medications which have NOT CHANGED    Details   !! ondansetron (ZOFRAN ODT) 4 mg disintegrating tablet Take 1 Tab by mouth every eight (8) hours as needed for Nausea., Normal, Disp-12 Tab, R-0      oxymetazoline (Afrin, oxymetazoline,) 0.05 % mist 1 Spray by Nasal route two (2) times daily as needed (nasal congestion). Use only for 3 days. Indications: stuffy nose, Normal, Disp-14.7 mL, R-0      cetirizine (ZYRTEC) 10 mg tablet Take 1 Tab by mouth daily for 90 days. Indications: inflammation of the nose due to an allergy, Normal, Disp-30 Tab, R-2      famotidine (PEPCID) 40 mg tablet Take 1 Tab by mouth daily. , Normal, Disp-30 Tab,R-5      busPIRone (BUSPAR) 7.5 mg tablet Take 1 Tab by mouth three (3) times daily. , Normal, Disp-90 Tab,R-3      fluticasone propion-salmeteroL (ADVAIR/WIXELA) 250-50 mcg/dose diskus inhaler Take 1 Puff by inhalation two (2) times a day., Normal, Disp-1 Each,R-5      albuterol (PROVENTIL VENTOLIN) 2.5 mg /3 mL (0.083 %) nebu 3 mL by Nebulization route every four (4) hours as needed for Wheezing., Normal, Disp-50 Each, R-2      albuterol (VENTOLIN HFA) 90 mcg/actuation inhaler Take  by inhalation. , Historical Med       !! - Potential duplicate medications found. Please discuss with provider. 2.   Follow-up Information     Follow up With Specialties Details Why Contact Info    Julieth Murcia MD Family Medicine In 3 days  [address] [address]  [phone]          3. Return to ED if worse     Diagnosis     Clinical Impression:   1. Dizziness    2. Nonintractable episodic headache, unspecified headache type        Attestations:    Jennifer Calderón MD    Please note that this dictation was completed with [employer], the computer voice recognition software. Quite often unanticipated grammatical, syntax, homophones, and other interpretive errors are inadvertently transcribed by the computer software. Please disregard these errors. Please excuse any errors that have escaped final proofreading. Thank you.

## 2021-06-23 LAB
ATRIAL RATE: 56 BPM
CALCULATED P AXIS, ECG09: 39 DEGREES
CALCULATED R AXIS, ECG10: 61 DEGREES
CALCULATED T AXIS, ECG11: 63 DEGREES
DIAGNOSIS, 93000: NORMAL
P-R INTERVAL, ECG05: 124 MS
Q-T INTERVAL, ECG07: 424 MS
QRS DURATION, ECG06: 94 MS
QTC CALCULATION (BEZET), ECG08: 409 MS
VENTRICULAR RATE, ECG03: 56 BPM

## 2021-10-27 ENCOUNTER — TELEPHONE (OUTPATIENT)
Dept: INTERNAL MEDICINE CLINIC | Age: 21
End: 2021-10-27

## 2021-10-27 NOTE — TELEPHONE ENCOUNTER
----- Message from Ami Goltz sent at 10/27/2021 12:52 PM EDT -----  Subject: Message to Provider    QUESTIONS  Information for Provider? Pt needs to schedule a TB test  ---------------------------------------------------------------------------  --------------  CALL BACK INFO  What is the best way for the office to contact you? OK to leave message on   voicemail  Preferred Call Back Phone Number?  [phone]  ---------------------------------------------------------------------------  --------------  SCRIPT ANSWERS  undefined

## 2021-11-15 ENCOUNTER — TELEPHONE (OUTPATIENT)
Dept: INTERNAL MEDICINE CLINIC | Age: 21
End: 2021-11-15

## 2021-11-15 NOTE — TELEPHONE ENCOUNTER
----- Message from Sonia Francis sent at 11/9/2021  1:14 PM EST -----  Subject: Message to Provider    QUESTIONS  Information for Provider? patient would like to schedule a TB test to   coincide with her appointment on 11/11/2021 at 36.   ---------------------------------------------------------------------------  --------------  [address]  What is the best way for the office to contact you? OK to leave message on   voicemail  Preferred Call Back Phone Number? [phone]  ---------------------------------------------------------------------------  --------------  SCRIPT ANSWERS  Relationship to Patient?  Self

## 2021-11-22 ENCOUNTER — OFFICE VISIT (OUTPATIENT)
Dept: INTERNAL MEDICINE CLINIC | Age: 21
End: 2021-11-22

## 2021-11-22 VITALS
HEIGHT: 62 IN | HEART RATE: 84 BPM | DIASTOLIC BLOOD PRESSURE: 74 MMHG | TEMPERATURE: 98.6 F | WEIGHT: 176 LBS | RESPIRATION RATE: 16 BRPM | OXYGEN SATURATION: 98 % | BODY MASS INDEX: 32.39 KG/M2 | SYSTOLIC BLOOD PRESSURE: 103 MMHG

## 2021-11-22 DIAGNOSIS — F32.1 MODERATE MAJOR DEPRESSION (HCC): Primary | ICD-10-CM

## 2021-11-22 DIAGNOSIS — M17.0 ARTHRITIS OF BOTH KNEES: ICD-10-CM

## 2021-11-22 DIAGNOSIS — J45.20 MILD INTERMITTENT REACTIVE AIRWAY DISEASE WITHOUT COMPLICATION: ICD-10-CM

## 2021-11-22 PROCEDURE — 99214 OFFICE O/P EST MOD 30 MIN: CPT | Performed by: FAMILY MEDICINE

## 2021-11-22 RX ORDER — NAPROXEN 500 MG/1
500 TABLET ORAL 2 TIMES DAILY WITH MEALS
Qty: 60 TABLET | Refills: 1 | Status: SHIPPED | OUTPATIENT
Start: 2021-11-22 | End: 2022-01-26 | Stop reason: SDUPTHER

## 2021-11-22 NOTE — PROGRESS NOTES
PROGRESS NOTE        SUBJECTIVE:  Diagnosis/Chief Complaint: Depression (depression and pt will cry in a room by herself for hours at a time.)    Agree with comments, see chief complaint. Having difficulty finding counseling services that will take her insurance, Pablo says there is a waiting list around 2 months. Doing well with mood no  Symptoms as above and legs ache off on switched legs, no trauma, thinks arthritis. See PHQ-9. Suicidal: no  Side affects: yes -antidepressants tend to make her tired, does not take them regularly enough to make a difference. Wants to do counseling. States taking medications per medicine list.yes -no antidepressants    Patient Active Problem List    Diagnosis Date Noted    Asthma, moderate 07/26/2020    Anxiety 07/26/2020    Moderate major depression ([facility] [address] 11/30/2018    Obesity, morbid ([facility] [address] 06/20/2018     Current Outpatient Medications   Medication Sig Dispense Refill    naproxen (NAPROSYN) 500 mg tablet Take 1 Tablet by mouth two (2) times daily (with meals). As needed 60 Tablet 1    albuterol (PROVENTIL VENTOLIN) 2.5 mg /3 mL (0.083 %) nebu 3 mL by Nebulization route every four (4) hours as needed for Wheezing. 50 Each 2    albuterol (VENTOLIN HFA) 90 mcg/actuation inhaler Take  by inhalation.  oxymetazoline (Afrin, oxymetazoline,) 0.05 % mist 1 Spray by Nasal route two (2) times daily as needed (nasal congestion). Use only for 3 days. Indications: stuffy nose (Patient not taking: Reported on 11/22/2021) 14.7 mL 0    fluticasone propion-salmeteroL (ADVAIR/WIXELA) 250-50 mcg/dose diskus inhaler Take 1 Puff by inhalation two (2) times a day.  (Patient not taking: Reported on 11/22/2021) 1 Each 5     No Known Allergies  Past Medical History:   Diagnosis Date    Depression     GERD (gastroesophageal reflux disease)     Headache     Mild asthma 6/3/2020     Social History     Tobacco Use    Smoking status: Former Smoker     Quit date: 7/21/2020     Years since quittin.3    Smokeless tobacco: Never Used   Substance Use Topics    Alcohol use: No        OBJECTIVE:    .  Visit Vitals  /74 (BP 1 Location: Left arm, BP Patient Position: Sitting, BP Cuff Size: Adult)   Pulse 84   Temp 98.6 °F (37 °C) (Temporal)   Resp 16   Ht 5' 2\" (1.575 m)   Wt 176 lb (79.8 kg)   LMP 11/15/2021   SpO2 98%   BMI 32.19 kg/m²     WDWN in NAD  Neurological exam[de-identified] 2-12 intact  Psychiatric: Normal mood, judgement  Knees grossly within normal limits no effusion redness or swelling. Reviewed: Medications, allergies, clinical lab test results and imaging results have been reviewed. Any abnormal findings have been addressed. ASSESSMENT:       ICD-10-CM ICD-9-CM    1. Moderate major depression (HCC)  F32.1 296.22 AMB SUPPLY ORDER   2. Arthritis of both knees  M17.0 716.96 naproxen (NAPROSYN) 500 mg tablet   3. Mild intermittent reactive airway disease without complication  S60.83 803.88        PLAN    Orders Placed This Encounter    AMB SUPPLY ORDER     [facility] counseling     naproxen (NAPROSYN) 500 mg tablet     Sig: Take 1 Tablet by mouth two (2) times daily (with meals). As needed     Dispense:  60 Tablet     Refill:  1     We have made a referral for you to see a specialist. It is your responsibility to call the specialist to schedule your own appointment. PLease ask to see if the specialist takes your insurance. Please keep your scheduled appoinment and call him or her  If you cannot make it. The name and phone number of the specialist should be on the referral paper that we give to you. Please call us if you cannot get an appointment or the appointment it is not soon enough.      Follow-up 2 months

## 2021-11-22 NOTE — PROGRESS NOTES
Chief Complaint   Patient presents with    Depression     Patient has not been out of the country in (14 months), NO diarrhea, NO cough, NO chest conjestion, NO temp. Pt has not been around anyone with these symptoms. Health Maintenance reviewed. I have reviewed the patient's medical history in detail and updated the computerized patient record. 1. Have you been to the ER, urgent care clinic since your last visit? yes  Hospitalized since your last visit?  no    2. Have you seen or consulted any other health care providers outside of the [address] [place] [address] since your last visit? Yes Include any pap smears or colon screening. Encouraged pt to discuss pt's wishes with spouse/partner/family and bring them in the next appt to follow thru with the Advanced Directive    @  [address], last 12 mths 9/29/2020   Able to walk? Yes   Fall in past 12 months? No       3 most recent PHQ Screens 11/22/2021   Little interest or pleasure in doing things Nearly every day   Feeling down, depressed, irritable, or hopeless Nearly every day   Total Score PHQ 2 6   Trouble falling or staying asleep, or sleeping too much -   Feeling tired or having little energy -   Poor appetite, weight loss, or overeating -   Feeling bad about yourself - or that you are a failure or have let yourself or your family down -   Trouble concentrating on things such as school, work, reading, or watching TV -   Moving or speaking so slowly that other people could have noticed; or the opposite being so fidgety that others notice -   Thoughts of being better off dead, or hurting yourself in some way -   PHQ 9 Score -   How difficult have these problems made it for you to do your work, take care of your home and get along with others -       Abuse Screening Questionnaire 11/22/2021   Do you ever feel afraid of your partner? N   Are you in a relationship with someone who physically or mentally threatens you?  N   Is it safe for you to go home?  Y       ADL Assessment 11/22/2021   Feeding yourself No Help Needed   Getting from bed to chair No Help Needed   Getting dressed No Help Needed   Bathing or showering No Help Needed   Walk across the room (includes cane/walker) No Help Needed   Using the telphone No Help Needed   Taking your medications No Help Needed   Preparing meals No Help Needed   Managing money (expenses/bills) No Help Needed   Moderately strenuous housework (laundry) No Help Needed   Shopping for personal items (toiletries/medicines) No Help Needed   Shopping for groceries No Help Needed   Driving No Help Needed   Climbing a flight of stairs No Help Needed   Getting to places beyond walking distances No Help Needed

## 2021-12-14 ENCOUNTER — CLINICAL SUPPORT (OUTPATIENT)
Dept: INTERNAL MEDICINE CLINIC | Age: 21
End: 2021-12-14
Payer: MEDICAID

## 2021-12-14 DIAGNOSIS — Z11.1 SCREENING FOR TUBERCULOSIS: Primary | ICD-10-CM

## 2021-12-14 PROCEDURE — 86580 TB INTRADERMAL TEST: CPT | Performed by: FAMILY MEDICINE

## 2021-12-14 NOTE — PROGRESS NOTES
Patient in for PPD placement for her job - she works at [employer] - denies any previous positive PPD tests - done annually - per verbal order of Partickrunal Juarez MD 0.1ml TB placed intradermally to right forearm - results to be read at 900am on 12/16/2021  Dalia Florentino LPN  34/30/9267  6:27 PM

## 2021-12-14 NOTE — LETTER
12/14/2021 9:43 AM    Ms. Marita House  [address] [address]      This letter is to inform you that patient was in our office today for placement of a PPD to right forearm. Results pending to be read on 12/16/2021.         Sincerely,        Lucía Dillon MD

## 2021-12-29 ENCOUNTER — VIRTUAL VISIT (OUTPATIENT)
Dept: INTERNAL MEDICINE CLINIC | Age: 21
End: 2021-12-29
Payer: MEDICAID

## 2021-12-29 DIAGNOSIS — J06.9 UPPER RESPIRATORY TRACT INFECTION, UNSPECIFIED TYPE: Primary | ICD-10-CM

## 2021-12-29 PROCEDURE — 99213 OFFICE O/P EST LOW 20 MIN: CPT | Performed by: FAMILY MEDICINE

## 2021-12-29 NOTE — PROGRESS NOTES
Oneil Romero is a 19 y.o. female who was phone evaluated on 12/29/2021. Consent:  She and/or her healthcare decision maker is aware that this patient-initiated Telehealth encounter is a billable service, with coverage as determined by her insurance carrier. She is aware that she may receive a bill and has provided verbal consent to proceed: Yes    I was in the office while conducting this encounter. Assessment & Plan:       URI zinc/cough lozengers. Prn f/up  [id number] [employer] Hassan work note  Patient was instructed on the limits of making a diagnosis at this visit. Was instructed to call us or go to the emergency room if the symptoms increased or if new symptoms appeared. Re test if Sx worsen  712  Subjective:        Congested no rhonorhea or HA. Throat sore x few days. Went out and got covid rapid test was neg. This AM. Ill 2 days. No fever not checked, coughing yelow mucus. Rx OTCs, min wheezes    No Known Allergies    Current Outpatient Medications   Medication Sig Dispense Refill    naproxen (NAPROSYN) 500 mg tablet Take 1 Tablet by mouth two (2) times daily (with meals). As needed 60 Tablet 1    oxymetazoline (Afrin, oxymetazoline,) 0.05 % mist 1 Spray by Nasal route two (2) times daily as needed (nasal congestion). Use only for 3 days. Indications: stuffy nose 14.7 mL 0    fluticasone propion-salmeteroL (ADVAIR/WIXELA) 250-50 mcg/dose diskus inhaler Take 1 Puff by inhalation two (2) times a day. 1 Each 5    albuterol (PROVENTIL VENTOLIN) 2.5 mg /3 mL (0.083 %) nebu 3 mL by Nebulization route every four (4) hours as needed for Wheezing. 50 Each 2    albuterol (VENTOLIN HFA) 90 mcg/actuation inhaler Take  by inhalation. We discussed the expected course, resolution and complications of the diagnosis(es) in detail. Medication risks, benefits, costs, interactions, and alternatives were discussed as indicated.   I advised her to contact the office if her condition worsens, changes or fails to improve as anticipated. She expressed understanding with the diagnosis(es) and plan. Pursuant to the emergency declaration under the [facility] waiver authority and the [employer] Act, this Virtual  Visit was conducted, with patient's consent, to reduce the patient's risk of exposure to COVID-19 and provide continuity of care for an established patient. Services were provided through a video synchronous discussion virtually to substitute for in-person clinic visit.     Kari De La Cruz MD

## 2021-12-29 NOTE — PROGRESS NOTES
Chief Complaint   Patient presents with    Concern For COVID-19 (Coronavirus)     cough, sore throat, family exposed to covid     Patient is aware that this is a Virtual Visit or Phone Call Only doctor's visit. Patient has not been out of the country in (14 months), NO diarrhea, NO cough, NO chest conjestion, NO temp. Pt has not been around anyone with these symptoms. Health Maintenance reviewed. I have reviewed the patient's medical history in detail and updated the computerized patient record. 1. Have you been to the ER, urgent care clinic since your last visit?  no Hospitalized since your last visit? No     2. Have you seen or consulted any other health care providers outside of the [address] [place] [address] since your last visit? No  Include any pap smears or colon screening. Encouraged pt to discuss pt's wishes with spouse/partner/family and bring them in the next appt to follow thru with the Advanced Directive      Fall Risk Assessment, last 12 mths 9/29/2020   Able to walk? Yes   Fall in past 12 months?  No       3 most recent PHQ Screens 11/22/2021   Little interest or pleasure in doing things Nearly every day   Feeling down, depressed, irritable, or hopeless Nearly every day   Total Score PHQ 2 6   Trouble falling or staying asleep, or sleeping too much Several days   Feeling tired or having little energy Several days   Poor appetite, weight loss, or overeating Not at all   Feeling bad about yourself - or that you are a failure or have let yourself or your family down Not at all   Trouble concentrating on things such as school, work, reading, or watching TV Several days   Moving or speaking so slowly that other people could have noticed; or the opposite being so fidgety that others notice Not at all   Thoughts of being better off dead, or hurting yourself in some way Not at all   PHQ 9 Score 9   How difficult have these problems made it for you to do your work, take care of your home and get along with others -       Abuse Screening Questionnaire 11/22/2021   Do you ever feel afraid of your partner? N   Are you in a relationship with someone who physically or mentally threatens you? N   Is it safe for you to go home?  Y       ADL Assessment 11/22/2021   Feeding yourself No Help Needed   Getting from bed to chair No Help Needed   Getting dressed No Help Needed   Bathing or showering No Help Needed   Walk across the room (includes cane/walker) No Help Needed   Using the telphone No Help Needed   Taking your medications No Help Needed   Preparing meals No Help Needed   Managing money (expenses/bills) No Help Needed   Moderately strenuous housework (laundry) No Help Needed   Shopping for personal items (toiletries/medicines) No Help Needed   Shopping for groceries No Help Needed   Driving No Help Needed   Climbing a flight of stairs No Help Needed   Getting to places beyond walking distances No Help Needed

## 2021-12-29 NOTE — LETTER
NOTIFICATION RETURN TO WORK / SCHOOL    12/29/2021 1:34 PM    Ms. Sil Bautista  [id number] [address] [id number]      To Whom It May Concern:    Sil Bautista is currently under the care of [address]. She will return to work/school on: Thursday, December 30, 2021. If there are questions or concerns please have the patient contact our office.         Sincerely,      Kayla Escobedo MD

## 2022-01-10 ENCOUNTER — CLINICAL SUPPORT (OUTPATIENT)
Dept: INTERNAL MEDICINE CLINIC | Age: 22
End: 2022-01-10
Payer: MEDICAID

## 2022-01-10 DIAGNOSIS — Z11.1 SCREENING FOR TUBERCULOSIS: Primary | ICD-10-CM

## 2022-01-10 PROCEDURE — 86580 TB INTRADERMAL TEST: CPT | Performed by: FAMILY MEDICINE

## 2022-01-10 NOTE — PROGRESS NOTES
Patient in for placement of PPD - 0.1ml Tuberculin Purified Protein derivative administered intradermally to right forearm per order of Wanna Slice MD - no reactions noted - denies any previous positive results of past PPD - results to be read after 3pm on 1/12/2022  Tyrese Loaiza LPN  4/50/7429  1:87 PM

## 2022-01-12 ENCOUNTER — CLINICAL SUPPORT (OUTPATIENT)
Dept: INTERNAL MEDICINE CLINIC | Age: 22
End: 2022-01-12

## 2022-01-12 DIAGNOSIS — Z11.1 SCREENING FOR TUBERCULOSIS: Primary | ICD-10-CM

## 2022-01-12 LAB
MM INDURATION POC: 0 MM (ref 0–5)
PPD POC: NEGATIVE NEGATIVE

## 2022-01-12 NOTE — PROGRESS NOTES
Chief Complaint   Patient presents with    PPD Reading     underneath pt's R/forearm      As per VORB from Dr. Dipak Martinez, read PPD test on pt's R/forearm underneath and it was negative.   Pt notified and given two copies of her negative test.

## 2022-01-17 ENCOUNTER — APPOINTMENT (OUTPATIENT)
Dept: CT IMAGING | Age: 22
End: 2022-01-17
Attending: EMERGENCY MEDICINE
Payer: MEDICAID

## 2022-01-17 ENCOUNTER — HOSPITAL ENCOUNTER (OUTPATIENT)
Age: 22
Setting detail: OBSERVATION
Discharge: HOME | End: 2022-01-17
Attending: EMERGENCY MEDICINE | Admitting: SURGERY
Payer: MEDICAID

## 2022-01-17 ENCOUNTER — APPOINTMENT (OUTPATIENT)
Dept: ULTRASOUND IMAGING | Age: 22
End: 2022-01-17
Attending: EMERGENCY MEDICINE
Payer: MEDICAID

## 2022-01-17 VITALS
OXYGEN SATURATION: 100 % | RESPIRATION RATE: 18 BRPM | TEMPERATURE: 98 F | HEART RATE: 82 BPM | HEIGHT: 62 IN | DIASTOLIC BLOOD PRESSURE: 72 MMHG | SYSTOLIC BLOOD PRESSURE: 122 MMHG | WEIGHT: 184.3 LBS | BODY MASS INDEX: 33.92 KG/M2

## 2022-01-17 DIAGNOSIS — R10.31 ABDOMINAL PAIN, RIGHT LOWER QUADRANT: Primary | ICD-10-CM

## 2022-01-17 LAB
ALBUMIN SERPL-MCNC: 3.9 G/DL (ref 3.5–5)
ALBUMIN/GLOB SERPL: 1 {RATIO} (ref 1.1–2.2)
ALP SERPL-CCNC: 62 U/L (ref 45–117)
ALT SERPL-CCNC: 13 U/L (ref 12–78)
ANION GAP SERPL CALC-SCNC: 4 MMOL/L (ref 5–15)
APPEARANCE UR: CLEAR
AST SERPL-CCNC: 11 U/L (ref 15–37)
BACTERIA URNS QL MICRO: ABNORMAL /HPF
BASOPHILS # BLD: 0.1 K/UL (ref 0–0.1)
BASOPHILS NFR BLD: 1 % (ref 0–1)
BILIRUB SERPL-MCNC: 0.5 MG/DL (ref 0.2–1)
BILIRUB UR QL: NEGATIVE
BUN SERPL-MCNC: 7 MG/DL (ref 6–20)
BUN/CREAT SERPL: 9 (ref 12–20)
CALCIUM SERPL-MCNC: 9.3 MG/DL (ref 8.5–10.1)
CHLORIDE SERPL-SCNC: 107 MMOL/L (ref 97–108)
CO2 SERPL-SCNC: 28 MMOL/L (ref 21–32)
COLOR UR: ABNORMAL
COVID-19 RAPID TEST, COVR: NOT DETECTED
CREAT SERPL-MCNC: 0.74 MG/DL (ref 0.55–1.02)
DIFFERENTIAL METHOD BLD: ABNORMAL
EOSINOPHIL # BLD: 0.2 K/UL (ref 0–0.4)
EOSINOPHIL NFR BLD: 2 % (ref 0–7)
EPITH CASTS URNS QL MICRO: ABNORMAL /LPF
ERYTHROCYTE [DISTWIDTH] IN BLOOD BY AUTOMATED COUNT: 13.2 % (ref 11.5–14.5)
GLOBULIN SER CALC-MCNC: 4 G/DL (ref 2–4)
GLUCOSE SERPL-MCNC: 73 MG/DL (ref 65–100)
GLUCOSE UR STRIP.AUTO-MCNC: NEGATIVE MG/DL
HCG UR QL: NEGATIVE
HCT VFR BLD AUTO: 36.5 % (ref 35–47)
HGB BLD-MCNC: 12 G/DL (ref 11.5–16)
HGB UR QL STRIP: NEGATIVE
IMM GRANULOCYTES # BLD AUTO: 0 K/UL (ref 0–0.04)
IMM GRANULOCYTES NFR BLD AUTO: 0 % (ref 0–0.5)
KETONES UR QL STRIP.AUTO: NEGATIVE MG/DL
LEUKOCYTE ESTERASE UR QL STRIP.AUTO: NEGATIVE
LIPASE SERPL-CCNC: 32 U/L (ref 73–393)
LYMPHOCYTES # BLD: 3.6 K/UL (ref 0.8–3.5)
LYMPHOCYTES NFR BLD: 28 % (ref 12–49)
MCH RBC QN AUTO: 30.5 PG (ref 26–34)
MCHC RBC AUTO-ENTMCNC: 32.9 G/DL (ref 30–36.5)
MCV RBC AUTO: 92.6 FL (ref 80–99)
MONOCYTES # BLD: 0.9 K/UL (ref 0–1)
MONOCYTES NFR BLD: 7 % (ref 5–13)
MUCOUS THREADS URNS QL MICRO: ABNORMAL /LPF
NEUTS SEG # BLD: 8.1 K/UL (ref 1.8–8)
NEUTS SEG NFR BLD: 62 % (ref 32–75)
NITRITE UR QL STRIP.AUTO: NEGATIVE
NRBC # BLD: 0 K/UL (ref 0–0.01)
NRBC BLD-RTO: 0 PER 100 WBC
PH UR STRIP: 8.5 [PH] (ref 5–8)
PLATELET # BLD AUTO: 276 K/UL (ref 150–400)
PMV BLD AUTO: 10.8 FL (ref 8.9–12.9)
POTASSIUM SERPL-SCNC: 4 MMOL/L (ref 3.5–5.1)
PROT SERPL-MCNC: 7.9 G/DL (ref 6.4–8.2)
PROT UR STRIP-MCNC: NEGATIVE MG/DL
RBC # BLD AUTO: 3.94 M/UL (ref 3.8–5.2)
RBC #/AREA URNS HPF: ABNORMAL /HPF (ref 0–5)
SODIUM SERPL-SCNC: 139 MMOL/L (ref 136–145)
SOURCE, COVRS: NORMAL
SP GR UR REFRACTOMETRY: 1.02 (ref 1–1.03)
UA: UC IF INDICATED,UAUC: ABNORMAL
UROBILINOGEN UR QL STRIP.AUTO: 1 EU/DL (ref 0.2–1)
WBC # BLD AUTO: 12.9 K/UL (ref 3.6–11)
WBC URNS QL MICRO: ABNORMAL /HPF (ref 0–4)

## 2022-01-17 PROCEDURE — 74177 CT ABD & PELVIS W/CONTRAST: CPT

## 2022-01-17 PROCEDURE — 83690 ASSAY OF LIPASE: CPT

## 2022-01-17 PROCEDURE — 87635 SARS-COV-2 COVID-19 AMP PRB: CPT

## 2022-01-17 PROCEDURE — 36415 COLL VENOUS BLD VENIPUNCTURE: CPT

## 2022-01-17 PROCEDURE — 74011000636 HC RX REV CODE- 636: Performed by: EMERGENCY MEDICINE

## 2022-01-17 PROCEDURE — G0378 HOSPITAL OBSERVATION PER HR: HCPCS

## 2022-01-17 PROCEDURE — 81001 URINALYSIS AUTO W/SCOPE: CPT

## 2022-01-17 PROCEDURE — 80053 COMPREHEN METABOLIC PANEL: CPT

## 2022-01-17 PROCEDURE — 85025 COMPLETE CBC W/AUTO DIFF WBC: CPT

## 2022-01-17 PROCEDURE — 99283 EMERGENCY DEPT VISIT LOW MDM: CPT

## 2022-01-17 PROCEDURE — 99218 PR INITIAL OBSERVATION CARE/DAY 30 MINUTES: CPT | Performed by: SURGERY

## 2022-01-17 PROCEDURE — 76830 TRANSVAGINAL US NON-OB: CPT

## 2022-01-17 PROCEDURE — 81025 URINE PREGNANCY TEST: CPT

## 2022-01-17 RX ORDER — DIPHENHYDRAMINE HYDROCHLORIDE 50 MG/ML
25 INJECTION, SOLUTION INTRAMUSCULAR; INTRAVENOUS
Status: CANCELLED | OUTPATIENT
Start: 2022-01-17

## 2022-01-17 RX ORDER — HYDRALAZINE HYDROCHLORIDE 20 MG/ML
20 INJECTION INTRAMUSCULAR; INTRAVENOUS
Status: CANCELLED | OUTPATIENT
Start: 2022-01-17

## 2022-01-17 RX ORDER — SODIUM CHLORIDE 0.9 % (FLUSH) 0.9 %
5-40 SYRINGE (ML) INJECTION EVERY 8 HOURS
Status: CANCELLED | OUTPATIENT
Start: 2022-01-17

## 2022-01-17 RX ORDER — ALBUTEROL SULFATE 0.83 MG/ML
2.5 SOLUTION RESPIRATORY (INHALATION)
Status: CANCELLED | OUTPATIENT
Start: 2022-01-17

## 2022-01-17 RX ORDER — NALOXONE HYDROCHLORIDE 0.4 MG/ML
0.4 INJECTION, SOLUTION INTRAMUSCULAR; INTRAVENOUS; SUBCUTANEOUS AS NEEDED
Status: CANCELLED | OUTPATIENT
Start: 2022-01-17

## 2022-01-17 RX ORDER — HYDROMORPHONE HYDROCHLORIDE 1 MG/ML
0.5 INJECTION, SOLUTION INTRAMUSCULAR; INTRAVENOUS; SUBCUTANEOUS
Status: CANCELLED | OUTPATIENT
Start: 2022-01-17

## 2022-01-17 RX ORDER — ACETAMINOPHEN 325 MG/1
650 TABLET ORAL
Status: CANCELLED | OUTPATIENT
Start: 2022-01-17

## 2022-01-17 RX ORDER — ONDANSETRON 2 MG/ML
4 INJECTION INTRAMUSCULAR; INTRAVENOUS
Status: CANCELLED | OUTPATIENT
Start: 2022-01-17

## 2022-01-17 RX ORDER — AMOXICILLIN AND CLAVULANATE POTASSIUM 875; 125 MG/1; MG/1
1 TABLET, FILM COATED ORAL 2 TIMES DAILY
Qty: 14 TABLET | Refills: 0 | Status: SHIPPED | OUTPATIENT
Start: 2022-01-17 | End: 2022-05-09 | Stop reason: ALTCHOICE

## 2022-01-17 RX ORDER — MORPHINE SULFATE 2 MG/ML
4 INJECTION, SOLUTION INTRAMUSCULAR; INTRAVENOUS
Status: DISCONTINUED | OUTPATIENT
Start: 2022-01-17 | End: 2022-01-17 | Stop reason: HOSPADM

## 2022-01-17 RX ORDER — SODIUM CHLORIDE 0.9 % (FLUSH) 0.9 %
5-40 SYRINGE (ML) INJECTION AS NEEDED
Status: CANCELLED | OUTPATIENT
Start: 2022-01-17

## 2022-01-17 RX ORDER — LORAZEPAM 2 MG/ML
1 INJECTION INTRAMUSCULAR
Status: CANCELLED | OUTPATIENT
Start: 2022-01-17

## 2022-01-17 RX ORDER — OXYCODONE HYDROCHLORIDE 5 MG/1
5 TABLET ORAL
Status: CANCELLED | OUTPATIENT
Start: 2022-01-17

## 2022-01-17 RX ORDER — DEXTROSE, SODIUM CHLORIDE, AND POTASSIUM CHLORIDE 5; .45; .15 G/100ML; G/100ML; G/100ML
125 INJECTION INTRAVENOUS CONTINUOUS
Status: CANCELLED | OUTPATIENT
Start: 2022-01-17

## 2022-01-17 RX ORDER — ONDANSETRON 2 MG/ML
4 INJECTION INTRAMUSCULAR; INTRAVENOUS
Status: DISCONTINUED | OUTPATIENT
Start: 2022-01-17 | End: 2022-01-17 | Stop reason: HOSPADM

## 2022-01-17 RX ADMIN — IOPAMIDOL 100 ML: 755 INJECTION, SOLUTION INTRAVENOUS at 13:09

## 2022-01-17 NOTE — CONSULTS
Gynecology History and Physical    Name: Minh Munroe MRN: [id number] SSN: [id number]    YOB: 2000  Age: 23 y.o. Sex: female       Subjective:      Chief complaint:  Abdominal pain, right ovarian cyst    Leeanne Sibley is a 23 y.o.  female presenting to ED RLQ pain x 2 days. CT scan done showing periappendiceal inflammatory changes possibly representing early appendicitis, as well as 3.8cm right ovarian cyst.  US was done, confirming right ovarian cyst.  GYN called with concern for torsion. On evaluation of patient, she reports her pain is improved since arriving to ER. She has declined appendectomy with general surgery  And states she strongly desires to avoid surgery. Reviewed US with her showing 4 x 3 x3cm complex cyst.   She denies F/C, N/V/D, or other complaints. OB History    No obstetric history on file. Past Medical History:   Diagnosis Date    Depression     GERD (gastroesophageal reflux disease)     Headache     Mild asthma 6/3/2020     History reviewed. No pertinent surgical history. Social History     Occupational History    Occupation: delivery     Comment: [de-identified]    Occupation: cosmotology   Tobacco Use    Smoking status: Former Smoker     Quit date: 2020     Years since quittin.4    Smokeless tobacco: Never Used   Substance and Sexual Activity    Alcohol use: No    Drug use: No    Sexual activity: Yes     Partners: Male, Female     History reviewed. No pertinent family history. No Known Allergies  Prior to Admission medications    Medication Sig Start Date End Date Taking? Authorizing Provider   naproxen (NAPROSYN) 500 mg tablet Take 1 Tablet by mouth two (2) times daily (with meals). As needed 21   Lenore Gastelum MD   oxymetazoline (Afrin, oxymetazoline,) 0.05 % mist 1 Spray by Nasal route two (2) times daily as needed (nasal congestion). Use only for 3 days.   Indications: stuffy nose 21   Emanuel MCRAE NP   fluticasone propion-salmeteroL (ADVAIR/WIXELA) 250-50 mcg/dose diskus inhaler Take 1 Puff by inhalation two (2) times a day. 7/9/20   Malak Martinez MD   albuterol (PROVENTIL VENTOLIN) 2.5 mg /3 mL (0.083 %) nebu 3 mL by Nebulization route every four (4) hours as needed for Wheezing. 6/3/20   Malka Martinez MD   albuterol (VENTOLIN HFA) 90 mcg/actuation inhaler Take  by inhalation. Provider, Historical        Review of Systems  A comprehensive review of systems was negative except for that written in the History of Present Illness. Objective:     Vitals:    01/17/22 1109   BP: 122/72   Pulse: 82   Resp: 18   Temp: 98 °F (36.7 °C)   SpO2: 100%   Weight: 83.6 kg (184 lb 4.9 oz)   Height: 5' 2\" (1.575 m)       Physical Exam:  Patient without distress. Heart: Regular rate and rhythm  Lung: normal respiratory effort  Abdomen: soft, nontender, soft, mildly tender RLQ, but no rebound or guarding     Assessment/Plan:     Active Problems:    RLQ abdominal pain (1/17/2022)     22 y. o. with right ovarian cyst, possibly hemorrhagic based on US images. I reviewed these images myself, unlikely that a sub-5cm ovarian cyst would torse, and there does appear to be flow to the ovary on US. Reviewed likely hemorrhagic cyst which can cause pain. She does not have a surgical abdomen, and strongly desires to avoid surgery, which I think is reasonable. She has an appointment at [facility] on 1/24, I will call to have US added to this appointment. We reviewed reasons to return to ED (ie worsening pain, N/V, or fevers). [id number] [address] for discharge from GYN standpoint with NSAIDs.      Mitch Tamez MD  1/17/2022  3:36 PM

## 2022-01-17 NOTE — H&P
Surgery Consult    Subjective:      Birdie Faye is a 23 y.o. female who is being seen for evaluation of abdominal pain. The pain is located in the RLQ. Pain is described as colicky. It comes and goes. It  measures 8/10 in intensity when it occurs. Onset of pain was 2 days ago. Aggravating factors include pressure. Alleviating factors include none. Associated symptoms include none. She denies nausea, vomiting, anorexia, fevers chills, SOB, cough and chest pain. She states she is having abdnomal menses this month. She thinks she is going to haave a second period in a few days. Her last was 2 weeks ago. She has a history of PCOS. Patient Active Problem List    Diagnosis Date Noted    RLQ abdominal pain 2022    Asthma, moderate 2020    Anxiety 2020    Moderate major depression ([facility] 2018    Obesity, morbid ([facility] 2018     Past Medical History:   Diagnosis Date    Depression     GERD (gastroesophageal reflux disease)     Headache     Mild asthma 6/3/2020      History reviewed. No pertinent surgical history. Social History     Tobacco Use    Smoking status: Former Smoker     Quit date: 2020     Years since quittin.4    Smokeless tobacco: Never Used   Substance Use Topics    Alcohol use: No      History reviewed. No pertinent family history. Current Facility-Administered Medications   Medication Dose Route Frequency    ondansetron (ZOFRAN) injection 4 mg  4 mg IntraVENous NOW    morphine injection 4 mg  4 mg IntraVENous ONCE PRN     Current Outpatient Medications   Medication Sig    naproxen (NAPROSYN) 500 mg tablet Take 1 Tablet by mouth two (2) times daily (with meals). As needed    oxymetazoline (Afrin, oxymetazoline,) 0.05 % mist 1 Spray by Nasal route two (2) times daily as needed (nasal congestion). Use only for 3 days.   Indications: stuffy nose    fluticasone propion-salmeteroL (ADVAIR/WIXELA) 250-50 mcg/dose diskus inhaler Take 1 Puff by inhalation two (2) times a day.  albuterol (PROVENTIL VENTOLIN) 2.5 mg /3 mL (0.083 %) nebu 3 mL by Nebulization route every four (4) hours as needed for Wheezing.  albuterol (VENTOLIN HFA) 90 mcg/actuation inhaler Take  by inhalation. No Known Allergies    Review of Systems:    A comprehensive review of systems was negative except for that written in the History of Present Illness. Objective:        Visit Vitals  /72 (BP 1 Location: Left upper arm, BP Patient Position: At rest)   Pulse 82   Temp 98 °F (36.7 °C)   Resp 18   Ht 5' 2\" (1.575 m)   Wt 83.6 kg (184 lb 4.9 oz)   SpO2 100%   BMI 33.71 kg/m²       Physical Exam:  GENERAL: alert, cooperative, no distress, appears stated age, LUNG: clear to auscultation bilaterally, HEART: regular rate and rhythm, S1, S2 normal, no murmur, click, rub or gallop, ABDOMEN: soft, non-tender. Bowel sounds normal. No masses,  no organomegaly    Imaging:      CT:     1. Periappendiceal inflammatory changes, with no associated appendiceal  dilatation or wall thickening; findings could represent early acute  appendicitis. 2. Enlarged right ovary containing a 3.8 cm cyst; correlate clinically for signs  of ovarian torsion.     RLQ U/S - Pending       Lab/Data Review:  BMP:   Lab Results   Component Value Date/Time     01/17/2022 11:16 AM    K 4.0 01/17/2022 11:16 AM     01/17/2022 11:16 AM    CO2 28 01/17/2022 11:16 AM    AGAP 4 (L) 01/17/2022 11:16 AM    GLU 73 01/17/2022 11:16 AM    BUN 7 01/17/2022 11:16 AM    CREA 0.74 01/17/2022 11:16 AM    GFRAA >60 01/17/2022 11:16 AM    GFRNA >60 01/17/2022 11:16 AM     CBC:   Lab Results   Component Value Date/Time    WBC 12.9 (H) 01/17/2022 11:16 AM    HGB 12.0 01/17/2022 11:16 AM    HCT 36.5 01/17/2022 11:16 AM     01/17/2022 11:16 AM         Assessment:     Abdominal pain, suspect acute appendicitis. I think the ovarian cyst is an incidental finding. Plan:     1.  I recommend proceeding with Surgery:  Appendectomy. 2. Discussed aspects of surgical intervention, methods, risks (including by not limited to infection, bleeding, hematoma, and perforation of the intestines or solid organs), and the risks of general anesthetic. The patient understands the risks; any and all questions were answered to the patient's satisfaction. 3. Patient does not wish to proceed with surgery. She want to avoid surgery. She is refusing a rapid COVID test as well which is required by the OR for surgery. 4. We discussed antibiotic management for early, uncomplicated appendicitis. She is aware there is a 30% failure rate. S    5. Will admit for observation overnight given question of possible ovarian issues. If she is better in the morning, Will send home on oral Abx. If no better or worse then exploration with appendectomy.

## 2022-01-17 NOTE — Clinical Note
Patient Class[de-identified] OBSERVATION [440]   Type of Bed: Surgical [18]   Cardiac Monitoring Required?: No   Reason for Observation: RLQ pain   Admitting Diagnosis: RLQ abdominal pain [064668]   Admitting Physician: [address] [place] [address]   Attending Physician: [address] [place] [address]

## 2022-01-17 NOTE — DISCHARGE INSTRUCTIONS
Please know that you are welcome to return at anytime if you change your mind about hospitalization.   We have discussed the risks of leaving which include worsening of the appendix infection which could lead to rupture of the appendix which could result in extensive surgery, worsening infection which could result in debility and death

## 2022-01-17 NOTE — ED PROVIDER NOTES
EMERGENCY DEPARTMENT HISTORY AND PHYSICAL EXAM      Date: 1/17/2022  Patient Name: Petra Lobo    History of Presenting Illness     Chief Complaint   Patient presents with    Abdominal Pain     10/10 since Saturday, pt also having urinary complaints, denies N/V/D       History Provided By: Patient    HPI: Petra Lobo, 23 y.o. female with history of PCOS without other significant history presents to the ED with cc of abdominal pain. Symptoms onset about 2 days ago. Symptoms started left lower quadrant and was intermittent, became more constant and radiated to the right lower quadrant where it settled today. Pain became more severe and constant today. She states every bump in the road hurt her on her drive in. Denies fevers. She does endorse nausea without vomiting. Denies change in bowel habits or urinary symptoms. Pain now located to the right lower quadrant without radiation. She feels more comfortable sleeping in fetal position. Denies any prior abdominal surgical history. There are no other complaints, changes, or physical findings at this time. PCP: Chilango Ceja MD    No current facility-administered medications on file prior to encounter. Current Outpatient Medications on File Prior to Encounter   Medication Sig Dispense Refill    naproxen (NAPROSYN) 500 mg tablet Take 1 Tablet by mouth two (2) times daily (with meals). As needed 60 Tablet 1    oxymetazoline (Afrin, oxymetazoline,) 0.05 % mist 1 Spray by Nasal route two (2) times daily as needed (nasal congestion). Use only for 3 days. Indications: stuffy nose 14.7 mL 0    fluticasone propion-salmeteroL (ADVAIR/WIXELA) 250-50 mcg/dose diskus inhaler Take 1 Puff by inhalation two (2) times a day. 1 Each 5    albuterol (PROVENTIL VENTOLIN) 2.5 mg /3 mL (0.083 %) nebu 3 mL by Nebulization route every four (4) hours as needed for Wheezing. 50 Each 2    albuterol (VENTOLIN HFA) 90 mcg/actuation inhaler Take  by inhalation. Past History     Past Medical History:  Past Medical History:   Diagnosis Date    Depression     GERD (gastroesophageal reflux disease)     Headache     Mild asthma 6/3/2020       Past Surgical History:  History reviewed. No pertinent surgical history. Family History:  History reviewed. No pertinent family history. Social History:  Social History     Tobacco Use    Smoking status: Former Smoker     Quit date: 2020     Years since quittin.4    Smokeless tobacco: Never Used   Substance Use Topics    Alcohol use: No    Drug use: No       Allergies:  No Known Allergies      Review of Systems   Review of Systems   Constitutional: Negative for chills and fever. Respiratory: Negative for cough and shortness of breath. Cardiovascular: Negative for chest pain and leg swelling. Gastrointestinal: Positive for abdominal pain and nausea. Negative for diarrhea and vomiting. Genitourinary: Negative. Musculoskeletal: Negative for back pain and gait problem. Skin: Negative for color change and rash. Neurological: Negative for dizziness, weakness, light-headedness and headaches. All other systems reviewed and are negative. Physical Exam   Physical Exam  Vitals and nursing note reviewed. Constitutional:       General: She is not in acute distress. Appearance: Normal appearance. She is not ill-appearing or toxic-appearing. HENT:      Head: Normocephalic and atraumatic. Nose: Nose normal.      Mouth/Throat:      Mouth: Mucous membranes are moist.   Eyes:      Extraocular Movements: Extraocular movements intact. Pupils: Pupils are equal, round, and reactive to light. Cardiovascular:      Rate and Rhythm: Normal rate and regular rhythm. Heart sounds: No murmur heard. Pulmonary:      Effort: Pulmonary effort is normal. No respiratory distress. Breath sounds: Normal breath sounds. No wheezing. Abdominal:      General: There is no distension. Palpations: Abdomen is soft. Tenderness: There is abdominal tenderness in the right lower quadrant. There is guarding and rebound. Positive signs include Rovsing's sign, McBurney's sign and psoas sign. Hernia: No hernia is present. Musculoskeletal:         General: No swelling or tenderness. Normal range of motion. Cervical back: Normal range of motion and neck supple. Right lower leg: No edema. Left lower leg: No edema. Skin:     General: Skin is warm and dry. Coloration: Skin is not pale. Findings: No erythema. Neurological:      General: No focal deficit present. Mental Status: She is alert and oriented to person, place, and time. Diagnostic Study Results     Labs -     Recent Results (from the past 12 hour(s))   METABOLIC PANEL, COMPREHENSIVE    Collection Time: 01/17/22 11:16 AM   Result Value Ref Range    Sodium 139 136 - 145 mmol/L    Potassium 4.0 3.5 - 5.1 mmol/L    Chloride 107 97 - 108 mmol/L    CO2 28 21 - 32 mmol/L    Anion gap 4 (L) 5 - 15 mmol/L    Glucose 73 65 - 100 mg/dL    BUN 7 6 - 20 MG/DL    Creatinine 0.74 0.55 - 1.02 MG/DL    BUN/Creatinine ratio 9 (L) 12 - 20      GFR est AA >60 >60 ml/min/1.73m2    GFR est non-AA >60 >60 ml/min/1.73m2    Calcium 9.3 8.5 - 10.1 MG/DL    Bilirubin, total 0.5 0.2 - 1.0 MG/DL    ALT (SGPT) 13 12 - 78 U/L    AST (SGOT) 11 (L) 15 - 37 U/L    Alk.  phosphatase 62 45 - 117 U/L    Protein, total 7.9 6.4 - 8.2 g/dL    Albumin 3.9 3.5 - 5.0 g/dL    Globulin 4.0 2.0 - 4.0 g/dL    A-G Ratio 1.0 (L) 1.1 - 2.2     LIPASE    Collection Time: 01/17/22 11:16 AM   Result Value Ref Range    Lipase 32 (L) 73 - 393 U/L   URINALYSIS W/ REFLEX CULTURE    Collection Time: 01/17/22 11:16 AM    Specimen: Urine   Result Value Ref Range    Color YELLOW/STRAW      Appearance CLEAR CLEAR      Specific gravity 1.019 1.003 - 1.030      pH (UA) 8.5 (H) 5.0 - 8.0      Protein Negative NEG mg/dL    Glucose Negative NEG mg/dL    Ketone Negative NEG mg/dL    Bilirubin Negative NEG      Blood Negative NEG      Urobilinogen 1.0 0.2 - 1.0 EU/dL    Nitrites Negative NEG      Leukocyte Esterase Negative NEG      WBC 0-4 0 - 4 /hpf    RBC 0-5 0 - 5 /hpf    Epithelial cells MODERATE (A) FEW /lpf    Bacteria 1+ (A) NEG /hpf    UA:UC IF INDICATED CULTURE NOT INDICATED BY UA RESULT CNI      Mucus 1+ (A) NEG /lpf   CBC WITH AUTOMATED DIFF    Collection Time: 01/17/22 11:16 AM   Result Value Ref Range    WBC 12.9 (H) 3.6 - 11.0 K/uL    RBC 3.94 3.80 - 5.20 M/uL    HGB 12.0 11.5 - 16.0 g/dL    HCT 36.5 35.0 - 47.0 %    MCV 92.6 80.0 - 99.0 FL    MCH 30.5 26.0 - 34.0 PG    MCHC 32.9 30.0 - 36.5 g/dL    RDW 13.2 11.5 - 14.5 %    PLATELET 947 370 - 306 K/uL    MPV 10.8 8.9 - 12.9 FL    NRBC 0.0 0  WBC    ABSOLUTE NRBC 0.00 0.00 - 0.01 K/uL    NEUTROPHILS 62 32 - 75 %    LYMPHOCYTES 28 12 - 49 %    MONOCYTES 7 5 - 13 %    EOSINOPHILS 2 0 - 7 %    BASOPHILS 1 0 - 1 %    IMMATURE GRANULOCYTES 0 0.0 - 0.5 %    ABS. NEUTROPHILS 8.1 (H) 1.8 - 8.0 K/UL    ABS. LYMPHOCYTES 3.6 (H) 0.8 - 3.5 K/UL    ABS. MONOCYTES 0.9 0.0 - 1.0 K/UL    ABS. EOSINOPHILS 0.2 0.0 - 0.4 K/UL    ABS. BASOPHILS 0.1 0.0 - 0.1 K/UL    ABS. IMM. GRANS. 0.0 0.00 - 0.04 K/UL    DF AUTOMATED     HCG URINE, QL. - POC    Collection Time: 01/17/22 11:48 AM   Result Value Ref Range    Pregnancy test,urine (POC) Negative NEG         Radiologic Studies -   CT ABD PELV W CONT   Final Result      1. Periappendiceal inflammatory changes, with no associated appendiceal   dilatation or wall thickening; findings could represent early acute   appendicitis. 2. Enlarged right ovary containing a 3.8 cm cyst; correlate clinically for signs   of ovarian torsion. The findings were called to Dr. Carmen Senior on 1/17/2022 at 1:33 PM by Dr. Luis Angel Benavides. [email or web address]    (Results Pending)     CT Results  (Last 48 hours)               01/17/22 1308  CT ABD PELV W CONT Final result    Impression:      1. Periappendiceal inflammatory changes, with no associated appendiceal   dilatation or wall thickening; findings could represent early acute   appendicitis. 2. Enlarged right ovary containing a 3.8 cm cyst; correlate clinically for signs   of ovarian torsion. The findings were called to Dr. Taylor Banks on 1/17/2022 at 1:33 PM by Dr. Trudy Younger. [id number]           Narrative:  EXAM: CT ABD PELV W CONT       INDICATION: RLQ pain eval for appendicitis        COMPARISON: None        CONTRAST: 100 mL of Isovue-370. TECHNIQUE:    Following the uneventful intravenous administration of contrast, thin axial   images were obtained through the abdomen and pelvis. Coronal and sagittal   reconstructions were generated. Oral contrast was not administered. CT dose   reduction was achieved through use of a standardized protocol tailored for this   examination and automatic exposure control for dose modulation. FINDINGS:    LOWER THORAX: Minimal bilateral dependent atelectasis. LIVER: No mass. BILIARY TREE: Gallbladder is within normal limits. CBD is not dilated. SPLEEN: within normal limits. PANCREAS: No mass or ductal dilatation. ADRENALS: Unremarkable. KIDNEYS: No mass, calculus, or hydronephrosis. STOMACH: Unremarkable. SMALL BOWEL: No dilatation or wall thickening. COLON: No dilatation or wall thickening. APPENDIX: Periappendiceal inflammatory changes, with no associated appendiceal   dilatation or wall thickening. PERITONEUM: No ascites or pneumoperitoneum. RETROPERITONEUM: No lymphadenopathy or aortic aneurysm. REPRODUCTIVE ORGANS: Enlarged right ovary containing a 3.8 cm cyst.   URINARY BLADDER: No mass or calculus. BONES: No destructive bone lesion. ABDOMINAL WALL: No mass or hernia. ADDITIONAL COMMENTS: N/A               CXR Results  (Last 48 hours)    None          Medical Decision Making   I am the first provider for this patient.     I reviewed the vital signs, available nursing notes, past medical history, past surgical history, family history and social history. Vital Signs-Reviewed the patient's vital signs. Patient Vitals for the past 12 hrs:   Temp Pulse Resp BP SpO2   01/17/22 1109 98 °F (36.7 °C) 82 18 122/72 100 %       Records Reviewed: Nursing Notes    Provider Notes (Medical Decision Making):   Healthy 17-year-old female presenting to the emergency department with right lower quadrant abdominal pain. Symptoms appear consistent clinically with acute appendicitis. She is afebrile and vital signs are stable but she does have mild leukocytosis 13,000. Urinalysis negative, no pregnancy. Denies vaginal bleeding. She does have history of PCOS and differential also includes ovarian torsion and cyst.  CT demonstrating early periappendiceal inflammation, also with right ovarian cyst correlate for torsion. I will evaluate with TVUS, however her symptoms appear clinically more consistent with appendicitis and given periappendiceal inflammation I will ask surgery to evaluate. Discussed with Dr. Max Arroyo, surgery, who has evaluated patient and will admit for further management. ED Course:   Initial assessment performed. The patients presenting problems have been discussed, and they are in agreement with the care plan formulated and outlined with them. I have encouraged them to ask questions as they arise throughout their visit. Admission Note:  Patient is being admitted to the hospital by Dr. Max Arroyo, Service: Hospitalist.  The results of their tests and reasons for their admission have been discussed with them and available family. They convey agreement and understanding for the need to be admitted and for their admission diagnosis. Disposition:  Admit      Diagnosis     Clinical Impression:   1.  Acute appendicitis with localized peritonitis, without perforation, abscess, or gangrene        Attestations:  I am the first and primary provider of record for this patient's ED encounter. I personally performed the services described above in this documentation. Katiana Cloby MD    Please note that this dictation was completed with [employer], the computer voice recognition software. Quite often unanticipated grammatical, syntax, homophones, and other interpretive errors are inadvertently transcribed by the computer software. Please disregard these errors. Please excuse any errors that have escaped final proofreading. Thank you.

## 2022-01-17 NOTE — PROGRESS NOTES
CM met with patient at the bedside to complete initial  assessment and provide patient with State Observation Notice. Patient stated that she wished to be discharged and did not want to be admitted. CM stated she would inform provider. Oral and Written notification given to patient and/or caregiver informing patient of current Observation status receiving care in our facility. Copied placed on bedside chart.        STEPHAN CedenoN, RN, BSW   RN Care Manager

## 2022-01-17 NOTE — Clinical Note
[facility] EMERGENCY DEPT  [address]  [place] [id number] [phone]    Work/School Note    Date: 1/17/2022    To Whom It May concern:      Umu Hdz was seen and treated today in the emergency room by the following provider(s):  Attending Provider: Myrna Valle MD.      Umu Hdz is excused from work/school on 01/17/22. She is clear to return to work/school on 01/18/22.         Sincerely,          Marden Son, DO

## 2022-01-17 NOTE — ED NOTES
1426: Surgery at bedside. 1530: OB at bedside. Per OB patient is OK to be discharged home and follow up in office. 1730: Patient standing in hallway demanding for IV to be removed and to be discharged. MD made aware. 1755: John Orr has reviewed discharge instructions with the patient. The patient verbalized understanding. AMA paperwork completed by patient and MD. Patient ambulatory out of ED at this time. Gait steady.

## 2022-01-26 ENCOUNTER — OFFICE VISIT (OUTPATIENT)
Dept: INTERNAL MEDICINE CLINIC | Age: 22
End: 2022-01-26
Payer: MEDICAID

## 2022-01-26 VITALS
RESPIRATION RATE: 16 BRPM | TEMPERATURE: 98.5 F | DIASTOLIC BLOOD PRESSURE: 58 MMHG | WEIGHT: 173 LBS | OXYGEN SATURATION: 97 % | SYSTOLIC BLOOD PRESSURE: 89 MMHG | HEIGHT: 62 IN | BODY MASS INDEX: 31.83 KG/M2 | HEART RATE: 104 BPM

## 2022-01-26 DIAGNOSIS — R10.2 PELVIC PAIN: Primary | ICD-10-CM

## 2022-01-26 DIAGNOSIS — N83.202 CYSTS OF BOTH OVARIES: ICD-10-CM

## 2022-01-26 DIAGNOSIS — R30.0 DYSURIA: ICD-10-CM

## 2022-01-26 DIAGNOSIS — N83.201 CYSTS OF BOTH OVARIES: ICD-10-CM

## 2022-01-26 DIAGNOSIS — M17.0 ARTHRITIS OF BOTH KNEES: ICD-10-CM

## 2022-01-26 LAB
BILIRUB UR QL STRIP: NORMAL
GLUCOSE UR-MCNC: NEGATIVE MG/DL
KETONES P FAST UR STRIP-MCNC: NORMAL MG/DL
PH UR STRIP: 6 [PH] (ref 4.6–8)
PROT UR QL STRIP: NORMAL
SP GR UR STRIP: 1.01 (ref 1–1.03)
UA UROBILINOGEN AMB POC: NORMAL (ref 0.2–1)
URINALYSIS CLARITY POC: NORMAL
URINALYSIS COLOR POC: NORMAL
URINE BLOOD POC: NORMAL
URINE LEUKOCYTES POC: NORMAL
URINE NITRITES POC: NEGATIVE

## 2022-01-26 PROCEDURE — 81003 URINALYSIS AUTO W/O SCOPE: CPT | Performed by: FAMILY MEDICINE

## 2022-01-26 PROCEDURE — 99214 OFFICE O/P EST MOD 30 MIN: CPT | Performed by: FAMILY MEDICINE

## 2022-01-26 RX ORDER — NAPROXEN 500 MG/1
500 TABLET ORAL 2 TIMES DAILY WITH MEALS
Qty: 60 TABLET | Refills: 0 | Status: SHIPPED | OUTPATIENT
Start: 2022-01-26 | End: 2022-05-09 | Stop reason: ALTCHOICE

## 2022-01-26 RX ORDER — BUSPIRONE HYDROCHLORIDE 5 MG/1
5 TABLET ORAL
Qty: 90 TABLET | Refills: 2 | Status: SHIPPED | OUTPATIENT
Start: 2022-01-26 | End: 2022-05-09 | Stop reason: ALTCHOICE

## 2022-01-26 NOTE — PROGRESS NOTES
Chief Complaint   Patient presents with    Anxiety    Depression    Abdominal Pain     ER FU - abd pain - 2 cyst to overy      Patient has not been out of the country in (14 months), NO diarrhea, NO cough, NO chest conjestion, NO temp. Pt has not been around anyone with these symptoms. Health Maintenance reviewed. I have reviewed the patient's medical history in detail and updated the computerized patient record. 1. Have you been to the ER, urgent care clinic since your last visit? Yes  Hospitalized since your last visit?  no    2. Have you seen or consulted any other health care providers outside of the [address] [place] [address] since your last visit? No Include any pap smears or colon screening. Encouraged pt to discuss pt's wishes with spouse/partner/family and bring them in the next appt to follow thru with the Advanced Directive    @  [address], last 12 mths 9/29/2020   Able to walk? Yes   Fall in past 12 months? No       3 most recent PHQ Screens 1/26/2022   Little interest or pleasure in doing things Several days   Feeling down, depressed, irritable, or hopeless Several days   Total Score PHQ 2 2   Trouble falling or staying asleep, or sleeping too much -   Feeling tired or having little energy -   Poor appetite, weight loss, or overeating -   Feeling bad about yourself - or that you are a failure or have let yourself or your family down -   Trouble concentrating on things such as school, work, reading, or watching TV -   Moving or speaking so slowly that other people could have noticed; or the opposite being so fidgety that others notice -   Thoughts of being better off dead, or hurting yourself in some way -   PHQ 9 Score -   How difficult have these problems made it for you to do your work, take care of your home and get along with others -       Abuse Screening Questionnaire 11/22/2021   Do you ever feel afraid of your partner?  N   Are you in a relationship with someone who physically or mentally threatens you? N   Is it safe for you to go home?  Y       ADL Assessment 11/22/2021   Feeding yourself No Help Needed   Getting from bed to chair No Help Needed   Getting dressed No Help Needed   Bathing or showering No Help Needed   Walk across the room (includes cane/walker) No Help Needed   Using the telphone No Help Needed   Taking your medications No Help Needed   Preparing meals No Help Needed   Managing money (expenses/bills) No Help Needed   Moderately strenuous housework (laundry) No Help Needed   Shopping for personal items (toiletries/medicines) No Help Needed   Shopping for groceries No Help Needed   Driving No Help Needed   Climbing a flight of stairs No Help Needed   Getting to places beyond walking distances No Help Needed

## 2022-01-28 LAB — BACTERIA UR CULT: NORMAL

## 2022-01-29 NOTE — PROGRESS NOTES
Subjective:     Chief Complaint   Patient presents with    Anxiety    Depression    Abdominal Pain     ER FU - abd pain - 2 cyst to overy         She  is a 23y.o. year old female who presents for evaluation of lower abdomen pelvic pain for which she went to the emergency room on 2022. Evaluation done there included both a CAT scan and pelvic abdominal ultrasound. Had blood work done as well, physical exam was abnormal.  CT scan showed some evidence of early appendicitis but ultrasound also showed a fairly large 4 cm right ovarian pelvic cyst.  She was in the ER for a fairly extensive period of time grew tired and decided she would leave. She was prescribed Augmentin which she took for a few days and then stopped. Told to follow-up with me and gynecology. States the pain since then has been reduced. She is able to eat and drink okay    ROS:  Some dysuria. No fever. Asks for refills of her anxiety medicines which generally work okay. No Known Allergies   Social History     Socioeconomic History    Marital status: SINGLE    Number of children: 0   Occupational History    Occupation: delivery     Comment: [de-identified]    Occupation: cosmotology   Tobacco Use    Smoking status: Former Smoker     Quit date: 2020     Years since quittin.5    Smokeless tobacco: Never Used   Substance and Sexual Activity    Alcohol use: No    Drug use: No    Sexual activity: Yes     Partners: Male, Female   Social History Narrative    Lives with grandparents     Trying to get pregnant      History reviewed. No pertinent family history. History reviewed. No pertinent surgical history.    Past Medical History:   Diagnosis Date    Depression     GERD (gastroesophageal reflux disease)     Headache     Mild asthma 6/3/2020            Objective:     Physical Examination:  Visit Vitals  BP (!) 89/58 (BP 1 Location: Left arm, BP Patient Position: Sitting, BP Cuff Size: Adult)   Pulse (!) 104   Temp 98.5 °F (36.9 °C) (Temporal)   Resp 16   Ht 5' 2\" (1.575 m)   Wt 173 lb (78.5 kg)   SpO2 97%   BMI 31.64 kg/m²   -    - General: pleasant, no distress, good eye contact  - Mental status:  Normal mood, behavior, speech, dress, motor activity and thought processes  - Cardiovascular: regular, normal rate, normal S1 and S2, no murmurs/rubs/gallops   - Abdomen, positive bowel sounds no guarding no rebound  - Respiratory: clear to auscultation bilaterally  - Psychiatric: normal mood and affect  -       Labs/Procedures:    Results for orders placed or performed in visit on 01/26/22   CULTURE, URINE    Specimen: Urine   Result Value Ref Range    Urine Culture, Routine       Mixed urogenital vasile  Less than 10,000 colonies/mL      Narrative    Performed at:  [address] [facility]  [address]  [id number]  : Bita Chowdary MD, Phone:  [phone]   AMB POC URINALYSIS DIP STICK AUTO W/O MICRO   Result Value Ref Range    Color (UA POC) Dark Yellow     Clarity (UA POC) Cloudy     Glucose (UA POC) Negative Negative    Bilirubin (UA POC) 1+ Negative    Ketones (UA POC) Trace Negative    Specific gravity (UA POC) 1.015 1.001 - 1.035    Blood (UA POC) Trace Negative    pH (UA POC) 6.0 4.6 - 8.0    Protein (UA POC) 2+ Negative    Urobilinogen (UA POC) 8 mg/dL 0.2 - 1    Nitrites (UA POC) Negative Negative    Leukocyte esterase (UA POC) Trace Negative         Assessment/ Plan:       ICD-10-CM ICD-9-CM    1. Pelvic pain  R10.2 [id number] US PELV NON OBS      AR HANDLG&/OR CONVEY OF SPEC FOR TR OFFICE TO LAB   2. Dysuria  R30.0 788.1 AMB POC URINALYSIS DIP STICK AUTO W/O MICRO      CULTURE, URINE      CULTURE, URINE      AR HANDLG&/OR CONVEY OF SPEC FOR TR OFFICE TO LAB      CANCELED: AMB POC URINALYSIS DIP STICK MANUAL W/O MICRO   3. Cysts of both ovaries  N83.201 620.2 AR HANDLG&/OR CONVEY OF SPEC FOR TR OFFICE TO LAB    N83.202     4.  Arthritis of both knees  M17.0 716.96 naproxen (NAPROSYN) 500 mg tablet SD HANDLG&/OR CONVEY OF SPEC FOR TR OFFICE TO LAB     Abdomen precautions given, told to finish off antibiotics. Do abdomen pelvic ultrasound, further advice after they return. I have discussed the diagnosis with the patient and the intended plan as seen in the above orders. The patient has received an after-visit summary and questions were answered concerning future plans. I have discussed medication side effects and warnings with the patient as well. Follow-up and Dispositions    · Return in about 1 week (around 2/2/2022) for routine follow up.

## 2022-03-18 PROBLEM — E66.01 OBESITY, MORBID (HCC): Status: ACTIVE | Noted: 2018-06-20

## 2022-03-18 PROBLEM — R10.31 RLQ ABDOMINAL PAIN: Status: ACTIVE | Noted: 2022-01-17

## 2022-03-18 PROBLEM — J45.909 ASTHMA, MODERATE: Status: ACTIVE | Noted: 2020-07-26

## 2022-03-19 PROBLEM — F32.1 MODERATE MAJOR DEPRESSION (HCC): Status: ACTIVE | Noted: 2018-11-30

## 2022-03-19 PROBLEM — F41.9 ANXIETY: Status: ACTIVE | Noted: 2020-07-26

## 2022-05-09 ENCOUNTER — CLINICAL SUPPORT (OUTPATIENT)
Dept: INTERNAL MEDICINE CLINIC | Age: 22
End: 2022-05-09

## 2022-05-09 ENCOUNTER — VIRTUAL VISIT (OUTPATIENT)
Dept: INTERNAL MEDICINE CLINIC | Age: 22
End: 2022-05-09
Payer: MEDICAID

## 2022-05-09 DIAGNOSIS — Z72.0 TOBACCO ABUSE: ICD-10-CM

## 2022-05-09 DIAGNOSIS — U07.1 COVID-19: Primary | ICD-10-CM

## 2022-05-09 DIAGNOSIS — J45.41 MODERATE PERSISTENT ASTHMA WITH EXACERBATION: Primary | ICD-10-CM

## 2022-05-09 DIAGNOSIS — R05.9 COUGH: ICD-10-CM

## 2022-05-09 PROBLEM — R10.31 RLQ ABDOMINAL PAIN: Status: RESOLVED | Noted: 2022-01-17 | Resolved: 2022-05-09

## 2022-05-09 PROCEDURE — 99214 OFFICE O/P EST MOD 30 MIN: CPT | Performed by: FAMILY MEDICINE

## 2022-05-09 RX ORDER — PREDNISONE 20 MG/1
40 TABLET ORAL
Qty: 10 TABLET | Refills: 0 | Status: SHIPPED | OUTPATIENT
Start: 2022-05-09 | End: 2022-05-14

## 2022-05-09 RX ORDER — AZITHROMYCIN 250 MG/1
250 TABLET, FILM COATED ORAL SEE ADMIN INSTRUCTIONS
Qty: 6 TABLET | Refills: 0 | Status: SHIPPED | OUTPATIENT
Start: 2022-05-09 | End: 2022-07-25

## 2022-05-09 NOTE — PROGRESS NOTES
Chief Complaint   Patient presents with    Cough      Patient is aware that this is a Virtual Visit or Phone Call Only doctor's visit. Patient has not been out of the country in (14 months), NO diarrhea, NO cough, NO chest conjestion, NO temp. Pt has not been around anyone with these symptoms. Health Maintenance reviewed. I have reviewed the patient's medical history in detail and updated the computerized patient record. 1. Have you been to the ER, urgent care clinic since your last visit? no  Hospitalized since your last visit?  no    2. Have you seen or consulted any other health care providers outside of the [address] [place] [address] since your last visit? No  Include any pap smears or colon screening. Encouraged pt to discuss pt's wishes with spouse/partner/family and bring them in the next appt to follow thru with the Advanced Directive      Fall Risk Assessment, last 12 mths 9/29/2020   Able to walk? Yes   Fall in past 12 months? No       3 most recent PHQ Screens 1/26/2022   Little interest or pleasure in doing things Several days   Feeling down, depressed, irritable, or hopeless Several days   Total Score PHQ 2 2   Trouble falling or staying asleep, or sleeping too much -   Feeling tired or having little energy -   Poor appetite, weight loss, or overeating -   Feeling bad about yourself - or that you are a failure or have let yourself or your family down -   Trouble concentrating on things such as school, work, reading, or watching TV -   Moving or speaking so slowly that other people could have noticed; or the opposite being so fidgety that others notice -   Thoughts of being better off dead, or hurting yourself in some way -   PHQ 9 Score -   How difficult have these problems made it for you to do your work, take care of your home and get along with others -       Abuse Screening Questionnaire 11/22/2021   Do you ever feel afraid of your partner?  N   Are you in a relationship with someone who physically or mentally threatens you? N   Is it safe for you to go home?  Y       ADL Assessment 11/22/2021   Feeding yourself No Help Needed   Getting from bed to chair No Help Needed   Getting dressed No Help Needed   Bathing or showering No Help Needed   Walk across the room (includes cane/walker) No Help Needed   Using the telphone No Help Needed   Taking your medications No Help Needed   Preparing meals No Help Needed   Managing money (expenses/bills) No Help Needed   Moderately strenuous housework (laundry) No Help Needed   Shopping for personal items (toiletries/medicines) No Help Needed   Shopping for groceries No Help Needed   Driving No Help Needed   Climbing a flight of stairs No Help Needed   Getting to places beyond walking distances No Help Needed

## 2022-05-09 NOTE — PROGRESS NOTES
Chief Complaint   Patient presents with    Concern For COVID-19 (Coronavirus)     Covid Test      COVID-19 test was taken to the parking lot and pt did not have any difficulties swabbing themselves. Pt tolerated well and all protective standard protocols were taken.

## 2022-05-09 NOTE — PROGRESS NOTES
Subjective:   Robel Ryan is a 23 y.o. female who complains of congestion, nasal blockage and cough described as productive of yellow sputum for 3-4 days, gradually worsening since that time. She denies a history of fevers, shortness of breath and sore throat. Evaluation to date: none. The weight at the urgent care was along  Treatment to date: Neb, OTC products. Patient does smoke cigarettes. Relevant PMH: Asthma. No Known Allergies  2/3 covid    Patient Active Problem List    Diagnosis Date Noted    RLQ abdominal pain 2022    Asthma, moderate 2020    Anxiety 2020    Moderate major depression ([facility] 2018    Obesity, morbid ([facility] 2018     Current Outpatient Medications   Medication Sig Dispense Refill    albuterol (PROVENTIL VENTOLIN) 2.5 mg /3 mL (0.083 %) nebu 3 mL by Nebulization route every four (4) hours as needed for Wheezing. 50 Each 2    albuterol (VENTOLIN HFA) 90 mcg/actuation inhaler Take  by inhalation.        No Known Allergies  Past Medical History:   Diagnosis Date    Depression     GERD (gastroesophageal reflux disease)     Headache     Mild asthma 6/3/2020     Social History     Socioeconomic History    Marital status: SINGLE     Spouse name: Not on file    Number of children: 0    Years of education: Not on file    Highest education level: Not on file   Occupational History    Occupation: delivery     Comment: [de-identified]    Occupation: [employer]ology   Tobacco Use    Smoking status: Current Every Day Smoker     Packs/day: 0.33     Types: Cigarettes     Last attempt to quit: 2020     Years since quittin.8    Smokeless tobacco: Never Used   Substance and Sexual Activity    Alcohol use: No    Drug use: No    Sexual activity: Yes     Partners: Male, Female   Other Topics Concern    Not on file   Social History Narrative    Lives with grandparents     Trying to get pregnant     Social Determinants of Health     Financial Resource Strain:     Difficulty of Paying Living Expenses: Not on file   Food Insecurity:     Worried About Running Out of Food in the Last Year: Not on file    Sy of Food in the Last Year: Not on file   Transportation Needs:     Lack of Transportation (Medical): Not on file    Lack of Transportation (Non-Medical): Not on file   Physical Activity:     Days of Exercise per Week: Not on file    Minutes of Exercise per Session: Not on file   Stress:     Feeling of Stress : Not on file   Social Connections:     Frequency of Communication with Friends and Family: Not on file    Frequency of Social Gatherings with Friends and Family: Not on file    Attends Worship Services: Not on file    Active Member of [address] [place] [address] or Organizations: Not on file    Attends Club or Organization Meetings: Not on file    Marital Status: Not on file   Intimate Partner Violence:     Fear of Current or Ex-Partner: Not on file    Emotionally Abused: Not on file    Physically Abused: Not on file    Sexually Abused: Not on file   Housing Stability:     Unable to Pay for Housing in the Last Year: Not on file    Number of [place] in the Last Year: Not on file    Unstable Housing in the Last Year: Not on file         Review of Systems  Pertinent items are noted in HPI. Objective: There were no vitals taken for this visit. General:  fatigued, cooperative, no distress   Eyes: negative   Ears:    Sinuses:    Mouth:     Neck:    Heart:    Lungs:    Abdomen:       Assessment/Plan:   viral upper respiratory illness poss covid and asthma  Suggested symptomatic OTC remedies. Antibiotics per orders. Encounter Diagnoses   Name Primary?  Moderate persistent asthma with exacerbation Yes    Cough     Tobacco abuse      Orders Placed This Encounter    NOVEL CORONAVIRUS (COVID-19) ([facility] Default)    azithromycin (ZITHROMAX) 250 mg tablet    predniSONE (DELTASONE) 20 mg tablet   .   Orders Placed This Encounter    NOVEL CORONAVIRUS (COVID-19) ([facility] Default)     Scheduling Instructions:      1) Due to current limited availability of the COVID-19 PCR test, tests will be prioritized and may not be completed.              2) Order only if the test result will change clinical management or necessary for a return to mission-critical employment decision.              3) Print and instruct patient to adhere to CDC home isolation program. (Link Above)              4) Set up or refer patient for a monitoring program.              5) Have patient sign up for and leverage MyChart (if not previously done). Order Specific Question:   Is this test for diagnosis or screening? Answer:   Diagnosis of ill patient     Order Specific Question:   Symptomatic for COVID-19 as defined by CDC? Answer:   Yes     Order Specific Question:   Date of Symptom Onset     Answer:   5/5/2022     Order Specific Question:   Hospitalized for COVID-19? Answer:   No     Order Specific Question:   Admitted to ICU for COVID-19? Answer:   No     Order Specific Question:   Employed in healthcare setting? Answer:   Yes     Order Specific Question:   Resident in a congregate (group) care setting? Answer:   No     Order Specific Question:   Pregnant? Answer:   No     Order Specific Question:   Previously tested for COVID-19? Answer: Yes    azithromycin (ZITHROMAX) 250 mg tablet     Sig: Take 1 Tablet by mouth See Admin Instructions. Take two tablets today then one tablet daily for next 4 days     Dispense:  6 Tablet     Refill:  0    predniSONE (DELTASONE) 20 mg tablet     Sig: Take 40 mg by mouth daily (with breakfast) for 5 days. Dispense:  10 Tablet     Refill:  0     The patient was counseled on the dangers of tobacco use, and was advised to quit. Reviewed strategies to maximize success, including stress management.      Panchito Martinez, who was evaluated through a synchronous (real-time) audio-video encounter, and/or her healthcare decision maker, is aware that it is a billable service, which includes applicable co-pays, with coverage as determined by her insurance carrier. She provided verbal consent to proceed and patient identification was verified. This visit was conducted pursuant to the emergency declaration under the [facility], [address] [place] [address] authority and the [employer] General Act. A caregiver was present when appropriate. Ability to conduct physical exam was limited. The patient was located at home in a state where the provider was licensed to provide care.      --Angela Daniel MD on 5/9/2022 at 1:28 PM        Follow-up 1 month or sooner as needed

## 2022-05-09 NOTE — LETTER
NOTIFICATION RETURN TO WORK / SCHOOL    5/9/2022 1:38 PM    Ms. Jessica Chavez  [address] [address]      To Whom It May Concern:    Jessica Chavez is currently under the care of [address]. She will return to work/school on: Monday, May 16, 2022. If there are questions or concerns please have the patient contact our office.         Sincerely,      Robert Hall MD

## 2022-05-09 NOTE — Clinical Note
5/9/2022 1:36 PM    Ms. Elia Mcclendon  [address]  [id number]              Sincerely,      Susie Collier MD

## 2022-05-09 NOTE — LETTER
NOTIFICATION RETURN TO WORK / SCHOOL    5/9/2022 1:35 PM    Ms. Jude Page  [address] [address]      To Whom It May Concern:    Jude Page is currently under the care of [address]. She will return to work/school on: Monday, May 16, 2021. If there are questions or concerns please have the patient contact our office.         Sincerely,      Anaid Vigil MD

## 2022-05-10 LAB
SARS-COV-2, NAA 2 DAY TAT: NORMAL
SARS-COV-2, NAA: NOT DETECTED

## 2022-07-25 RX ORDER — BUTALBITAL, ACETAMINOPHEN AND CAFFEINE 50; 325; 40 MG/1; MG/1; MG/1
1-2 TABLET ORAL
COMMUNITY
Start: 2021-11-16

## 2022-07-25 NOTE — PERIOP NOTES
[facility]  Ambulatory Surgery Unit  Pre-operative Instructions    Surgery/Procedure Date  Monday, August 8, 2022            Tentative Arrival Time TBD      1. On the day of your surgery/procedure, please report to the Ambulatory Surgery Unit Registration Desk and sign in at your designated time. The Ambulatory Surgery Unit is located in [facility] on the [facility] side of the \[facility] [place] [facility]\"" across from the [address] [place] [address]. Please have all of your health insurance cards, co-payment, and a photo ID.    **TWO adults may accompany you the day of the procedure. We have limited seating available. If our waiting room is at capacity, your ride may be asked to remain in their vehicle. No one under 15 is allowed in the waiting room. Masks, fully covering the mouth and nose, are required in the waiting room. 2. You must have someone with you to drive you home, as you should not drive a car for 24 hours following anesthesia. Please make arrangements for a responsible adult friend or family member to stay with you for at least the first 24 hours after your surgery. 3. Do not have anything to eat or drink (including water, gum, mints, coffee, juice) after 11:59 PM, Sunday. This may not apply to medications prescribed by your physician. (Please note below the special instructions with medications to take the morning of surgery, if applicable.)    4. We recommend you do not drink any alcoholic beverages for 24 hours before and after your surgery. 5. Contact your surgeons office for instructions on the following medications: non-steroidal anti-inflammatory drugs (i.e. Advil, Aleve), vitamins, and supplements. (Some surgeons will want you to stop these medications prior to surgery and others may allow you to take them)   **If you are currently taking Plavix, Coumadin, Aspirin and/or other blood-thinning agents, contact your surgeon for instructions. ** Your surgeon will partner with the physician prescribing these medications to determine if it is safe to stop or if you need to continue taking. Please do not stop taking these medications without instructions from your surgeon. 6. In an effort to help prevent surgical site infection, we ask that you shower with an anti-bacterial soap (i.e. Dial/Safeguard, or the soap provided to you at your preadmission testing appointment) for 3 days prior to and on the morning of surgery, using a fresh towel after each shower. (Please begin this process with fresh bed linens.) Do not apply any lotions, powders, or deodorants after the shower on the day of your procedure. If applicable, please do not shave the operative site for 48 hours prior to surgery. 7. Wear comfortable clothes. Wear glasses instead of contacts. Do not bring any jewelry or money (other than copays or fees as instructed). Do not wear make-up, particularly mascara, the morning of your surgery. Do not wear nail polish, particularly if you are having foot /hand surgery. Wear your hair loose or down, no ponytails, buns, leon pins or clips. All body piercings must be removed. 8. You should understand that if you do not follow these instructions your surgery may be cancelled. If your physical condition changes (i.e. fever, cold or flu) please contact your surgeon as soon as possible. 9. It is important that you be on time. If a situation occurs where you may be late, or if you have any questions or problems, please call [phone].    10. Your surgery time may be subject to change. You will receive a phone call the day prior to surgery to confirm your arrival time. 11. Pediatric patients: please bring a change of clothes, diapers, bottle/sippy cup, pacifier, etc.      Special Instructions: Take all medications and inhalers, as prescribed, on the morning of surgery with a sip of water. I understand a pre-operative phone call will be made to verify my surgery time.   In the event that I am not available, I give permission for a message to be left on my answering service and/or with another person?       yes    Preop instructions reviewed  Pt verbalized understanding.       ___________________      ___________________      ________________  (Signature of Patient)          (Witness)                   (Date and Time)

## 2022-08-05 ENCOUNTER — ANESTHESIA EVENT (OUTPATIENT)
Dept: SURGERY | Age: 22
End: 2022-08-05
Payer: MEDICAID

## 2022-08-08 ENCOUNTER — HOSPITAL ENCOUNTER (OUTPATIENT)
Age: 22
Setting detail: OUTPATIENT SURGERY
Discharge: HOME | End: 2022-08-08
Attending: OBSTETRICS & GYNECOLOGY | Admitting: OBSTETRICS & GYNECOLOGY
Payer: MEDICAID

## 2022-08-08 ENCOUNTER — ANESTHESIA (OUTPATIENT)
Dept: SURGERY | Age: 22
End: 2022-08-08
Payer: MEDICAID

## 2022-08-08 VITALS
OXYGEN SATURATION: 98 % | TEMPERATURE: 97.4 F | BODY MASS INDEX: 38.46 KG/M2 | HEART RATE: 73 BPM | RESPIRATION RATE: 21 BRPM | WEIGHT: 209 LBS | HEIGHT: 62 IN | DIASTOLIC BLOOD PRESSURE: 82 MMHG | SYSTOLIC BLOOD PRESSURE: 127 MMHG

## 2022-08-08 DIAGNOSIS — N83.201 BILATERAL OVARIAN CYSTS: Primary | ICD-10-CM

## 2022-08-08 DIAGNOSIS — N83.202 BILATERAL OVARIAN CYSTS: Primary | ICD-10-CM

## 2022-08-08 LAB — HCG UR QL: NEGATIVE

## 2022-08-08 PROCEDURE — 77030008606 HC TRCR ENDOSC KII AMR -B: Performed by: OBSTETRICS & GYNECOLOGY

## 2022-08-08 PROCEDURE — 74011250637 HC RX REV CODE- 250/637: Performed by: ANESTHESIOLOGY

## 2022-08-08 PROCEDURE — 74011250636 HC RX REV CODE- 250/636: Performed by: NURSE ANESTHETIST, CERTIFIED REGISTERED

## 2022-08-08 PROCEDURE — 77030031139 HC SUT VCRL2 J&J -A: Performed by: OBSTETRICS & GYNECOLOGY

## 2022-08-08 PROCEDURE — 88307 TISSUE EXAM BY PATHOLOGIST: CPT

## 2022-08-08 PROCEDURE — 77030040922 HC BLNKT HYPOTHRM STRY -A: Performed by: OBSTETRICS & GYNECOLOGY

## 2022-08-08 PROCEDURE — 77030021678 HC GLIDESCP STAT DISP VERT -B: Performed by: ANESTHESIOLOGY

## 2022-08-08 PROCEDURE — 74011250637 HC RX REV CODE- 250/637

## 2022-08-08 PROCEDURE — 77030009851 HC PCH RTVR ENDOSC AMR -B: Performed by: OBSTETRICS & GYNECOLOGY

## 2022-08-08 PROCEDURE — 74011000250 HC RX REV CODE- 250: Performed by: ANESTHESIOLOGY

## 2022-08-08 PROCEDURE — 74011250636 HC RX REV CODE- 250/636: Performed by: ANESTHESIOLOGY

## 2022-08-08 PROCEDURE — 76210000046 HC AMBSU PH II REC FIRST 0.5 HR: Performed by: OBSTETRICS & GYNECOLOGY

## 2022-08-08 PROCEDURE — 77030018778 HC MANIP UTER VCAR CNMD -B: Performed by: OBSTETRICS & GYNECOLOGY

## 2022-08-08 PROCEDURE — 77030002933 HC SUT MCRYL J&J -A: Performed by: OBSTETRICS & GYNECOLOGY

## 2022-08-08 PROCEDURE — 81025 URINE PREGNANCY TEST: CPT

## 2022-08-08 PROCEDURE — 74011000250 HC RX REV CODE- 250: Performed by: OBSTETRICS & GYNECOLOGY

## 2022-08-08 PROCEDURE — 88305 TISSUE EXAM BY PATHOLOGIST: CPT

## 2022-08-08 PROCEDURE — 77030013079 HC BLNKT BAIR HGGR 3M -A: Performed by: ANESTHESIOLOGY

## 2022-08-08 PROCEDURE — 76210000035 HC AMBSU PH I REC 1 TO 1.5 HR: Performed by: OBSTETRICS & GYNECOLOGY

## 2022-08-08 PROCEDURE — 76060000064 HC AMB SURG ANES 2 TO 2.5 HR: Performed by: OBSTETRICS & GYNECOLOGY

## 2022-08-08 PROCEDURE — 77030018684: Performed by: OBSTETRICS & GYNECOLOGY

## 2022-08-08 PROCEDURE — 77030012799 HC TRCR GELPRT BLN AMR -B: Performed by: OBSTETRICS & GYNECOLOGY

## 2022-08-08 PROCEDURE — 77030034154 HC SHR COAG HARM ACE J&J -F: Performed by: OBSTETRICS & GYNECOLOGY

## 2022-08-08 PROCEDURE — 77030016151 HC PROTCTR LNS DFOG COVD -B: Performed by: OBSTETRICS & GYNECOLOGY

## 2022-08-08 PROCEDURE — 77030026438 HC STYL ET INTUB CARD -A: Performed by: ANESTHESIOLOGY

## 2022-08-08 PROCEDURE — 2709999900 HC NON-CHARGEABLE SUPPLY: Performed by: OBSTETRICS & GYNECOLOGY

## 2022-08-08 PROCEDURE — 77030008684 HC TU ET CUF COVD -B: Performed by: ANESTHESIOLOGY

## 2022-08-08 PROCEDURE — 77030040361 HC SLV COMPR DVT MDII -B: Performed by: OBSTETRICS & GYNECOLOGY

## 2022-08-08 PROCEDURE — 76030000004 HC AMB SURG OR TIME 2 TO 2.5: Performed by: OBSTETRICS & GYNECOLOGY

## 2022-08-08 PROCEDURE — 74011000250 HC RX REV CODE- 250: Performed by: NURSE ANESTHETIST, CERTIFIED REGISTERED

## 2022-08-08 PROCEDURE — 74011000636 HC RX REV CODE- 636: Performed by: OBSTETRICS & GYNECOLOGY

## 2022-08-08 RX ORDER — OXYCODONE HYDROCHLORIDE 5 MG/1
5 TABLET ORAL
Qty: 20 TABLET | Refills: 0 | Status: SHIPPED | OUTPATIENT
Start: 2022-08-08 | End: 2022-08-11

## 2022-08-08 RX ORDER — OXYCODONE HYDROCHLORIDE 5 MG/1
TABLET ORAL
Status: COMPLETED
Start: 2022-08-08 | End: 2022-08-08

## 2022-08-08 RX ORDER — PROPOFOL 10 MG/ML
INJECTION, EMULSION INTRAVENOUS AS NEEDED
Status: DISCONTINUED | OUTPATIENT
Start: 2022-08-08 | End: 2022-08-08 | Stop reason: HOSPADM

## 2022-08-08 RX ORDER — ONDANSETRON 2 MG/ML
INJECTION INTRAMUSCULAR; INTRAVENOUS AS NEEDED
Status: DISCONTINUED | OUTPATIENT
Start: 2022-08-08 | End: 2022-08-08 | Stop reason: HOSPADM

## 2022-08-08 RX ORDER — MORPHINE SULFATE 10 MG/ML
2 INJECTION, SOLUTION INTRAMUSCULAR; INTRAVENOUS
Status: DISCONTINUED | OUTPATIENT
Start: 2022-08-08 | End: 2022-08-08 | Stop reason: HOSPADM

## 2022-08-08 RX ORDER — ONDANSETRON 4 MG/1
4 TABLET, ORALLY DISINTEGRATING ORAL
Qty: 20 TABLET | Refills: 1 | Status: SHIPPED | OUTPATIENT
Start: 2022-08-08

## 2022-08-08 RX ORDER — LIDOCAINE HYDROCHLORIDE 20 MG/ML
INJECTION, SOLUTION EPIDURAL; INFILTRATION; INTRACAUDAL; PERINEURAL AS NEEDED
Status: DISCONTINUED | OUTPATIENT
Start: 2022-08-08 | End: 2022-08-08 | Stop reason: HOSPADM

## 2022-08-08 RX ORDER — ACETAMINOPHEN 500 MG
1000 TABLET ORAL ONCE
Status: COMPLETED | OUTPATIENT
Start: 2022-08-08 | End: 2022-08-08

## 2022-08-08 RX ORDER — ROCURONIUM BROMIDE 10 MG/ML
INJECTION, SOLUTION INTRAVENOUS AS NEEDED
Status: DISCONTINUED | OUTPATIENT
Start: 2022-08-08 | End: 2022-08-08 | Stop reason: HOSPADM

## 2022-08-08 RX ORDER — DIPHENHYDRAMINE HYDROCHLORIDE 50 MG/ML
12.5 INJECTION, SOLUTION INTRAMUSCULAR; INTRAVENOUS AS NEEDED
Status: DISCONTINUED | OUTPATIENT
Start: 2022-08-08 | End: 2022-08-08 | Stop reason: HOSPADM

## 2022-08-08 RX ORDER — IBUPROFEN 800 MG/1
TABLET ORAL
Qty: 30 TABLET | Refills: 1 | Status: SHIPPED | OUTPATIENT
Start: 2022-08-08

## 2022-08-08 RX ORDER — KETOROLAC TROMETHAMINE 30 MG/ML
INJECTION, SOLUTION INTRAMUSCULAR; INTRAVENOUS AS NEEDED
Status: DISCONTINUED | OUTPATIENT
Start: 2022-08-08 | End: 2022-08-08 | Stop reason: HOSPADM

## 2022-08-08 RX ORDER — MIDAZOLAM HYDROCHLORIDE 1 MG/ML
INJECTION, SOLUTION INTRAMUSCULAR; INTRAVENOUS AS NEEDED
Status: DISCONTINUED | OUTPATIENT
Start: 2022-08-08 | End: 2022-08-08 | Stop reason: HOSPADM

## 2022-08-08 RX ORDER — OXYCODONE AND ACETAMINOPHEN 5; 325 MG/1; MG/1
1 TABLET ORAL
Status: DISCONTINUED | OUTPATIENT
Start: 2022-08-08 | End: 2022-08-08 | Stop reason: HOSPADM

## 2022-08-08 RX ORDER — DEXAMETHASONE SODIUM PHOSPHATE 4 MG/ML
INJECTION, SOLUTION INTRA-ARTICULAR; INTRALESIONAL; INTRAMUSCULAR; INTRAVENOUS; SOFT TISSUE AS NEEDED
Status: DISCONTINUED | OUTPATIENT
Start: 2022-08-08 | End: 2022-08-08 | Stop reason: HOSPADM

## 2022-08-08 RX ORDER — FENTANYL CITRATE 50 UG/ML
INJECTION, SOLUTION INTRAMUSCULAR; INTRAVENOUS AS NEEDED
Status: DISCONTINUED | OUTPATIENT
Start: 2022-08-08 | End: 2022-08-08 | Stop reason: HOSPADM

## 2022-08-08 RX ORDER — GLYCOPYRROLATE 0.2 MG/ML
INJECTION INTRAMUSCULAR; INTRAVENOUS AS NEEDED
Status: DISCONTINUED | OUTPATIENT
Start: 2022-08-08 | End: 2022-08-08 | Stop reason: HOSPADM

## 2022-08-08 RX ORDER — NEOSTIGMINE METHYLSULFATE 1 MG/ML
INJECTION, SOLUTION INTRAVENOUS AS NEEDED
Status: DISCONTINUED | OUTPATIENT
Start: 2022-08-08 | End: 2022-08-08 | Stop reason: HOSPADM

## 2022-08-08 RX ORDER — SODIUM CHLORIDE 0.9 % (FLUSH) 0.9 %
5-40 SYRINGE (ML) INJECTION AS NEEDED
Status: DISCONTINUED | OUTPATIENT
Start: 2022-08-08 | End: 2022-08-08 | Stop reason: HOSPADM

## 2022-08-08 RX ORDER — HYDROMORPHONE HYDROCHLORIDE 1 MG/ML
.2-.5 INJECTION, SOLUTION INTRAMUSCULAR; INTRAVENOUS; SUBCUTANEOUS ONCE
Status: COMPLETED | OUTPATIENT
Start: 2022-08-08 | End: 2022-08-08

## 2022-08-08 RX ORDER — SODIUM CHLORIDE 0.9 % (FLUSH) 0.9 %
5-40 SYRINGE (ML) INJECTION EVERY 8 HOURS
Status: DISCONTINUED | OUTPATIENT
Start: 2022-08-08 | End: 2022-08-08 | Stop reason: HOSPADM

## 2022-08-08 RX ORDER — LIDOCAINE HYDROCHLORIDE 10 MG/ML
0.1 INJECTION, SOLUTION EPIDURAL; INFILTRATION; INTRACAUDAL; PERINEURAL AS NEEDED
Status: DISCONTINUED | OUTPATIENT
Start: 2022-08-08 | End: 2022-08-08 | Stop reason: HOSPADM

## 2022-08-08 RX ORDER — SODIUM CHLORIDE, SODIUM LACTATE, POTASSIUM CHLORIDE, CALCIUM CHLORIDE 600; 310; 30; 20 MG/100ML; MG/100ML; MG/100ML; MG/100ML
25 INJECTION, SOLUTION INTRAVENOUS CONTINUOUS
Status: DISCONTINUED | OUTPATIENT
Start: 2022-08-08 | End: 2022-08-08 | Stop reason: HOSPADM

## 2022-08-08 RX ORDER — DROPERIDOL 2.5 MG/ML
0.62 INJECTION, SOLUTION INTRAMUSCULAR; INTRAVENOUS AS NEEDED
Status: DISCONTINUED | OUTPATIENT
Start: 2022-08-08 | End: 2022-08-08 | Stop reason: HOSPADM

## 2022-08-08 RX ORDER — OXYCODONE HYDROCHLORIDE 5 MG/1
5 TABLET ORAL ONCE
Status: COMPLETED | OUTPATIENT
Start: 2022-08-08 | End: 2022-08-08

## 2022-08-08 RX ORDER — FENTANYL CITRATE 50 UG/ML
25 INJECTION, SOLUTION INTRAMUSCULAR; INTRAVENOUS
Status: DISCONTINUED | OUTPATIENT
Start: 2022-08-08 | End: 2022-08-08 | Stop reason: HOSPADM

## 2022-08-08 RX ORDER — BUPIVACAINE HYDROCHLORIDE AND EPINEPHRINE 5; 5 MG/ML; UG/ML
30 INJECTION, SOLUTION EPIDURAL; INTRACAUDAL; PERINEURAL ONCE
Status: COMPLETED | OUTPATIENT
Start: 2022-08-08 | End: 2022-08-08

## 2022-08-08 RX ADMIN — FENTANYL CITRATE 25 MCG: 50 INJECTION, SOLUTION INTRAMUSCULAR; INTRAVENOUS at 16:27

## 2022-08-08 RX ADMIN — Medication 1000 MG: at 11:27

## 2022-08-08 RX ADMIN — FENTANYL CITRATE 100 MCG: 50 INJECTION, SOLUTION INTRAMUSCULAR; INTRAVENOUS at 13:39

## 2022-08-08 RX ADMIN — MIDAZOLAM HYDROCHLORIDE 2 MG: 1 INJECTION, SOLUTION INTRAMUSCULAR; INTRAVENOUS at 13:32

## 2022-08-08 RX ADMIN — SODIUM CHLORIDE, POTASSIUM CHLORIDE, SODIUM LACTATE AND CALCIUM CHLORIDE 25 ML/HR: 600; 310; 30; 20 INJECTION, SOLUTION INTRAVENOUS at 11:26

## 2022-08-08 RX ADMIN — ROCURONIUM BROMIDE 10 MG: 10 INJECTION INTRAVENOUS at 14:23

## 2022-08-08 RX ADMIN — ROCURONIUM BROMIDE 50 MG: 10 INJECTION INTRAVENOUS at 13:39

## 2022-08-08 RX ADMIN — PROPOFOL 200 MG: 10 INJECTION, EMULSION INTRAVENOUS at 13:39

## 2022-08-08 RX ADMIN — ROCURONIUM BROMIDE 20 MG: 10 INJECTION INTRAVENOUS at 14:52

## 2022-08-08 RX ADMIN — ONDANSETRON HYDROCHLORIDE 4 MG: 2 INJECTION, SOLUTION INTRAMUSCULAR; INTRAVENOUS at 13:51

## 2022-08-08 RX ADMIN — FENTANYL CITRATE 100 MCG: 50 INJECTION, SOLUTION INTRAMUSCULAR; INTRAVENOUS at 14:04

## 2022-08-08 RX ADMIN — GLYCOPYRROLATE 0.5 MG: 0.2 INJECTION, SOLUTION INTRAMUSCULAR; INTRAVENOUS at 15:09

## 2022-08-08 RX ADMIN — Medication 3 MG: at 15:09

## 2022-08-08 RX ADMIN — DEXAMETHASONE SODIUM PHOSPHATE 10 MG: 4 INJECTION, SOLUTION INTRAMUSCULAR; INTRAVENOUS at 13:51

## 2022-08-08 RX ADMIN — ROCURONIUM BROMIDE 10 MG: 10 INJECTION INTRAVENOUS at 14:14

## 2022-08-08 RX ADMIN — OXYCODONE 5 MG: 5 TABLET ORAL at 16:49

## 2022-08-08 RX ADMIN — SODIUM CHLORIDE, POTASSIUM CHLORIDE, SODIUM LACTATE AND CALCIUM CHLORIDE: 600; 310; 30; 20 INJECTION, SOLUTION INTRAVENOUS at 14:20

## 2022-08-08 RX ADMIN — KETOROLAC TROMETHAMINE 30 MG: 30 INJECTION, SOLUTION INTRAMUSCULAR; INTRAVENOUS at 15:46

## 2022-08-08 RX ADMIN — OXYCODONE HYDROCHLORIDE 5 MG: 5 TABLET ORAL at 16:49

## 2022-08-08 RX ADMIN — HYDROMORPHONE HYDROCHLORIDE 0.5 MG: 1 INJECTION, SOLUTION INTRAMUSCULAR; INTRAVENOUS; SUBCUTANEOUS at 15:27

## 2022-08-08 RX ADMIN — LIDOCAINE HYDROCHLORIDE 100 MG: 20 INJECTION, SOLUTION INTRAVENOUS at 13:39

## 2022-08-08 NOTE — ANESTHESIA POSTPROCEDURE EVALUATION
Procedure(s):  LAPAROSCOPIC RIGHT OOPHORECTOMY, LEFT CYSTECTOMY AND CHROMOPERTUBATION. general    Anesthesia Post Evaluation      Multimodal analgesia: multimodal analgesia used between 6 hours prior to anesthesia start to PACU discharge  Patient location during evaluation: PACU  Patient participation: complete - patient participated  Level of consciousness: awake and alert  Pain management: satisfactory to patient  Airway patency: patent  Anesthetic complications: no  Cardiovascular status: acceptable  Respiratory status: acceptable  Hydration status: acceptable  Post anesthesia nausea and vomiting:  none  Final Post Anesthesia Temperature Assessment:  Normothermia (36.0-37.5 degrees C)      INITIAL Post-op Vital signs:   Vitals Value Taken Time   /94 08/08/22 1630   Temp 36.3 °C (97.4 °F) 08/08/22 1630   Pulse 67 08/08/22 1644   Resp 23 08/08/22 1644   SpO2 97 % 08/08/22 1644   Vitals shown include unvalidated device data.

## 2022-08-08 NOTE — H&P
Pre-operative Evaluation / History & Physical    Sent From: Sent To: [facility]  [address] [facility], [address] [facility]  Phone: [phone] Fax: [phone]     Patient Information  Patient Name Natalia HOYOS   2000 Age 15yo  Address [address] [place] [address] Dr Phone H: [phone]  M: [phone]  Primary Insurance [employer] [facility] HMO)  ID: [id number]  [facility]: [id number]  Policy [place]: Marybeth Nicholas  Secondary Insurance None recorded. Pre-Op Visit and Medical History  Chief Complaint preoperative evaluation*, Virtual Visit - Established Patient  History of Present Illness   Virtual Visit  Reported by patient. Patient location: Other  Patient identity verified by: Insurance eligibility check  Physical address & phone number verified (for 911): Yes  I shared my identity credentials with the patient: Yes  I educated the patient on the nature of a virtual visit: Yes  Emergency plan agreed upon: Yes  VV  15yo  patient presents today for Pre Op eval  bilateral ovarian cysts, possible pyo vs hydrosalpinx  open to diagnostic laparoscopy, possible cystectomy, possible salpingectomy - discussed possible chromopertubation as well if appropriate as patient is hoping to optimize future fertility  Left Ovary  Abnormal. Size 4.6 cm x 4.6 cm x 3.4 cm. Mean 4.20 cm. Vol 37.5 cm³ Cyst(s) 1. Size 26.9 mm x 19.8 mm x 23.6 mm. Mean 23.4 mm. Vol 6.582 cm³. Complex cyst vs. endometrioma  2. Size 26.5 mm x 12.5 mm x 12.6 mm. Mean 17.2 mm. Vol 2.185 cm³.  Simple cyst vs. corpus luteum  Past Medical History Past Medical History not reviewed (last reviewed 02/10/2022)  Anxiety Disorder: Y  Depression: Y  Surgical History Surgical History not reviewed (last reviewed 2022)  Extraction of wisdom tooth  Gynecological / Obstetrical History GYN History not reviewed (last reviewed 2022)  Date of LMP: 06/27/2022. Frequency of Cycle (Q days): 28 (Notes: - 30). Duration of Flow (days): 5. Flow: Heavy. Menses Monthly: Y. Menstrual Cramps: moderate. Date of Last Pap Smear: (Notes: 2020; normal per patient done @ [facility]). HPV testing results: Negative. Abnormal Pap: N. Age at Menarche: 13. HPV Vaccine: N. Sexual Orientation: Homosexual.  Sexually Active?: Yes (Notes: with a female). Sexual Problems?: N.  STIs/STDs: N.  Current Birth Control Method: Condoms. Ovarian Cyst: Y. Social History Social History not reviewed (last reviewed 07/06/2022)  Substance Use  Do you or have you ever smoked tobacco?: Current every day smoker  Do you or have you ever used e-cigarettes or vape?: Never used electronic cigarettes  Do you or have you ever used smokeless tobacco?: Never used smokeless tobacco  How much tobacco do you chew?: none  What was the date of your most recent tobacco screening?: 01/19/2022  What is your level of alcohol consumption?: None  Do you use any illicit or recreational drugs?: No  Education and Occupation  Are you currently employed?: Yes  Who is your employer?: [employer]  Diet and Exercise  What is your exercise level?: Occasional  Marriage and Sexuality  What is your relationship status?: Single  Are you sexually active?: Yes (Notes: with a female)  OB/GYN Social History  Are you working: Unemployed  How often do you have a DRINK containing ALCOHOL?: Never  Family History Family History not reviewed (last reviewed 07/06/2022)  Father - No current problems or disability  Mother - No current problems or disability  Paternal Grandfather - Hypertensive disorder    - Diabetes mellitus  Allergies List   Allergies not reviewed (last reviewed 07/06/2022)  NKDA  Medications No medications reported  Review of Systems Additionally reports: Except as noted in the HPI, the review of systems is negative for General, Breast, , Resp, GI, CV, Endo, MS, Psych and Heme.   Vital Signs None recorded  Physical Exam   Constitutional: General Appearance: well developed and nourished. Eyes: Eyes extraocular movements intact and do not invoke pain. Eyelids normal appearance. Ears, Nose, Throat: Mouth: no swelling of face or lips and normal appearance. Neck: Thyroid: normal appearance. Lungs / Chest: Respiratory effort: unlabored and speaking in full sentences. Skin: General Skin normal general appearance. Neurologic: Cranial Nerves facial movement symmetric. Mental Status Exam: Orientation alert and oriented. Affect: appropriate affect. Mood: appropriate mood. Language and Speech: normal speech. Lab Results   Assessment and Plan   1. Cyst of ovary -  Markedly complex, irregular changes on the right with minimal if any normal ovarian tissue on US. The left ovary also has what appears to be an endometrioma. We discussed surgical management with Laparoscopic right oopherectomy, left cystectomy, and chromopertubation. I reviewed with the patient indications, alternatives, risks, and benefits of proposed procedure, the risks of which include injury to bowel, bladder, nerves, blood vessels, or ureters, injury to any other intraabdominal structure, risk of possible sterility, risk of bleeding and infection, and inherent risks of anesthesia, including death. The patient indicates understanding of these risks, and agrees to the proposed procedure. She is instructed to stay NPO after midnight the night before surgery.     N83.209: Unspecified ovarian cyst, unspecified side      Return to Office  Santos Nunn MD for Surgery at [facility] on 08/08/2022 at 12:30 PM  Santos Nunn MD for Post Op Exam at [facility] [place] Office on 09/19/2022 at 02:00 PM  Current Problems (Diagnoses) Reviewed Problems  Irregular periods - Onset: 03/09/2022    Laparoscopic right oopherectomy, left cystectomy, and chromopertubation / dea 8-8-2022 [facility] to call pt for PAT    Electronically Signed by: Kathleen Ricardo MD    _____________________________________________  Ordered/Documented by:  Visit Date: 07/27/2022  The History and Physical is reviewed today. The patient is seen and examined and no changes are required.   Shelia Ramon MD  8/8/2022  12:57 PM

## 2022-08-08 NOTE — PERIOP NOTES
1600 awakens to voice, denies pain. 1615 resting quietly, resp even, unlabored. 1625 c/o cramping bilateral lower abdomen 7/10  1628 medicated for pain with fentanyl  1649 tolerated crackers and po fluids oxycodone ir 5 mg given po.    1650 pain 2/10 tolerating po fluids without diff. All d/c instructions have been reviewed with girlfriend by phone. Verbalized understanding. [phone] d/c'd to car via wheelchair, all belongings with pt and girlfriend. Pt wearing glasses, instructions with girlfriend. Socks and bra in belongings bag with pt. Home with girlfriend.

## 2022-08-08 NOTE — PERIOP NOTES
Stacy Rao  2000  [id number]    Situation:  Verbal report given from: yovany santana and Sheryle Aures crna  Procedure: Procedure(s):  LAPAROSCOPIC RIGHT OOPHORECTOMY, LEFT CYSTECTOMY AND CHROMOPERTUBATION    Background:    Preoperative diagnosis: OVARIAN CYST AND PELVIC MASS    Postoperative diagnosis: OVARIAN CYST AND PELVIC MASS    :  Dr. Wendi Barrientos    Assistant(s): Circ-1: Rosaline Jackman RN  Scrub Tech-1: Anne Marie Fowler  Surg Asst-1: Geoffrey Muir    Specimens:   ID Type Source Tests Collected by Time Destination   1 : LEFT OVARIAN CYST WALL Preservative Ovarian Cyst, Left  Astrid Cm MD 8/8/2022 1429 Pathology   2 : RIGHT OVARY Preservative Ovary  Astrid Cm MD 8/8/2022 1431 Pathology       Assessment:  Intra-procedure medications         Anesthesia gave intra-procedure sedation and medications, see anesthesia flow sheet     Intravenous fluids: LR@ KVO     Vital signs stable       Recommendation:    Permission to share finding with girlfriend, Herb[place]alex Christopher : yes

## 2022-08-08 NOTE — PERIOP NOTES
Permission received to review discharge instructions and discuss private health information with eDvin Dallas, girlfriend.

## 2022-08-08 NOTE — OP NOTES
Gynecologic Laparoscopy Procedure Note    Name: Marita House   Medical Record Number: [id number]   YOB: 2000  Date: 8/8/2022      Preoperative Diagnosis:   OVARIAN CYST AND PELVIC MASS    Postoperative Diagnosis: OVARIAN CYST AND PELVIC MASS    Surgeon: Juan F Ceron MD    Assistant: Lizbet Haque    Anesthesia: General    Procedure: Procedure(s):  LAPAROSCOPIC RIGHT OOPHORECTOMY, LEFT CYSTECTOMY AND CHROMOPERTUBATION    Estimated Blood Loss: less than 100  cc    Pathology /Specimens:   ID Type Source Tests Collected by Time Destination   1 : LEFT OVARIAN CYST WALL Preservative Ovarian Cyst, Left  Wanda Rosa MD 8/8/2022 1429 Pathology   2 : RIGHT OVARY Preservative Ovary  Wanda Rsoa MD 8/8/2022 1431 Pathology       Complications: None    Findings: The uterus is normal in appearance  Both tubes appear somewhat dilated and clubbed distally, without exceptionally apparent fimbria. Both ovaries are involved in dense adhesions to the tubes and the sidewalls on their respective sides. The left ovary appears overall normal although it was encased in adhesions and on dissection a crypt suggestive of prior TOA was discovered on the left side. This drained tan colored fluid and some elements of the wall of this defect were sent. The right ovary is markedly large and cystic with adhesions to the sidewall, tube, and uterus. There are no endometriosis implants or karthik dominic adhesions noted. On chromopertubation, neither tube is patent. Procedure in Detail:  The patient was taken to the operating room where she was placed in the supine position. After undergoing adequate general endotracheal anesthesia, she was placed in the [address] [place] [address] in the dorsal lithotomy position. Both arms were tucked to the side. The patient was then prepped and draped in the usual fashion and bermudez catheter was placed into the bladder.  Attention was first turned to the vagina where the speculum was placed in the vagina. The anterior lip of the cervix was grasped with a single-toothed tenaculum. VCAre uterine manipulator is placed. All other instruments were removed from the vagina and 's gloves were changed. Attention was then turned to the abdomen. An incision was made in the umbilicus. The Almaz technique exposes the fascia which is entered sharply and tagged, and then the peritoneum is accessed with some difficulty. A almaz trocar is placed. There was no evidence of bowel injury or bleeding. Pneumoperitoneum was obtained. Two accessory 5 mm ports were placed in the bilateral  lower quadrants in the same fashion under direct visualization . The above findings were noted. Ureters were identified. The left tube was meticulously dissected away from the left ovary. The cyst on the ovary is drained of tan fluid. Normal anatomy is restored, however tubal fimbriae are not apparent  On the right side, the mesosalpinx is dissected off of the ovary and the IP is secured with the Harmonic ACE. The adhesions to the uterus and sidewall were carefully resected and the ovary removed in an endocatch bag. Attention was turned to chromopertubation. About 100 mL of a dilute methylene blue solution is injected through the VCare. Neither Tube appears to be patent, although dye is able to be seen entering the tube at the cornu. Copious irrigation was performed. All instruments were removed and CO2 gas was suctioned out. There was good hemostasis. Fascia at umbilicus was closed with 0 vicryl. Skin incisions were injected with 0.5 % Marcaine with epinephrine and  were closed with subcuticular 4-0 monocril followed by Dermabond on the skin. All instruments were removed from the vagina. The patient was awakened, extubated and taken to the recovery room in good condition.

## 2022-08-08 NOTE — ANESTHESIA PREPROCEDURE EVALUATION
Relevant Problems   RESPIRATORY SYSTEM   (+) Asthma, moderate      NEUROLOGY   (+) Moderate major depression (HCC)      ENDOCRINE   (+) Obesity, morbid (HCC)       Anesthetic History   No history of anesthetic complications            Review of Systems / Medical History  Patient summary reviewed, nursing notes reviewed and pertinent labs reviewed    Pulmonary          Smoker (1/2 ppd)  Asthma (mild.  Only has wheezing with URIs)        Neuro/Psych         Psychiatric history     Cardiovascular  Within defined limits                Exercise tolerance: >4 METS  Comments: 06/21 EKG= SB   GI/Hepatic/Renal     GERD: well controlled           Endo/Other        Obesity     Other Findings   Comments: Right ovarian cyst & pelvic mass         Physical Exam    Airway  Mallampati: I  TM Distance: 4 - 6 cm  Neck ROM: normal range of motion   Mouth opening: Normal     Cardiovascular    Rhythm: regular  Rate: normal         Dental  No notable dental hx       Pulmonary  Breath sounds clear to auscultation               Abdominal  GI exam deferred      Comments: Piercing in nasal septum Other Findings            Anesthetic Plan    ASA: 2  Anesthesia type: general    Monitoring Plan: BIS      Induction: Intravenous  Anesthetic plan and risks discussed with: Patient      Tylenol po preop

## 2022-08-08 NOTE — DISCHARGE INSTRUCTIONS
After Care Instructions For Your Laparoscopy      >>>You received Tylenol 1000mg prior to your surgery, you may take Tylenol (or pain medication containing Tylenol or Acetaminophen) in 6 hours at 5:30pm.<<<     >>>You received Toradol during your surgery. You may not take any form of NSAIDS (non steroidal anti inflammatory drugs) such as Advil, Ibuprofen, Aleve, Motrin until 10:00pm.<<<     You may resume your usual diet once the nausea resolves. Initially, try sips of warm fluids and a bland diet. Avoid heavy lifting or straining. Gradually increase your activity. First try walking and doing light activity around the house. You may resume your normal habits if no significant discomfort or bleeding develops. Most women can return to work within 2-7 days after this procedure. Sexual intercourse can be resumed as soon as desired provided the discomfort following surgery has subsided. You may take showers or tub baths. It is also safe to swim or use hot tubs once you feel up to it. Some lower abdominal cramping typically occurs after this procedure. Tylenol, Motrin or your prescribed pain medication should relieve this discomfort. You should notice steady improvement in the pain over the next day or so. It is also not unusual to experience some discomfort under your ribs or to notice neck or shoulder soreness. This is due to gas used during the surgery to help the physicians see your pelvic organs. The gas is reabsorbed over a 24 hour course. It is not unusual to have vaginal spotting lasting 2-3 days. It is difficult to predict when your next menstrual period will occur as your body has undergone a major stress. Your period should regulate itself within the first 6 weeks post-op. A bruise may appear around the incision and will gradually resolve as it heals. The suture used to close the incision may be visible above the skin. If so, it will be removed at your office visit. However, frequently sutures are placed below the skin and will reabsorb on their own. There will be no need for removal.     Call the office at [phone] to report any of the following problems: Abdominal pain that is increasing in severity, heavy vaginal bleeding, drainage or separation of your incision site, temperature greater than 100.4 or persistent nausea and vomiting. You should be seen in the office following your surgery. Call for an appointment if this has not already been arranged. At this appointment, the findings noted at the time of your procedure will be discussed. Follow up in 1 month. How to Care for Your Wound after Its Treated with DERMABOND* topical skin Adhesive  DERMABOND* Topical skin adhesive (2-octyl cyanoacrylate) is a sterile, liquid skin adhesive that holds wound edges together. The film will usually remain in place for 5 to 10 days, then naturally fall off your skin. The following will answer some of your questions and provide instructions for proper care for your wound while it is healing:  CHECK WOUND APPEARANCE  Some swelling, redness, and pain are common with all wounds and normally will go away as the wound heals. If swelling, redness, or pain increases or if the wound feels warm to the tough, contact a doctor. Also contact a doctor if the wound edges reopen or separate. REPLACE BANDAGES  If your wound is bandaged, keep the bandage dry. Replace the dressing daily until the adhesive film has fallen off or if the bandage should become wet, unless otherwise instructed by your physician. When changing the dressing, do not place tape directly over the DERMABOND* adhesive film, because removing the tape later may also remove the film. AVOID TOPICAL MEDICATIONS  Do not apply liquid or ointment medications or any other product to your wound while the DERMABOND* adhesive film is in place. These may loosen the film before your wound is healed.   KEEP WOUND DRY AND PROTECTED  You may occasionally and briefly wet your wound in the shower or bath. Do not soak or scrub your wound, do not swim, and avoid periods of heavy perspiration until the DERMABOND* adhesive has naturally fallen off. After showering or bathing, gently blot your wound dry with a soft towel. If a protective dressing is being used, apply a fresh, dry bandage, being sure to keep the tape off the DERMABOND* adhesive film. Apply a clean, dry bandage over the wound if necessary to protect it. Protect your wound from injury until the skin has had sufficient time to heal.  Do not scratch, rub, or pick at the DERMABOND* adhesive film. This may loosen the film before your wound is healed. Protect the wound from prolonged exposure to sunlight or tanning lamps while the film is in place. If you have any questions or concerns about this product, please consult your doctor. DO NOT TAKE TYLENOL/ACETAMINOPHEN WITH PERCOCET, [facility], [address] TAKE NARCOTIC PAIN MEDICATIONS WITH FOOD     Narcotics tend to be constipating, we suggest taking a stool softener such as Colace or Miralax (follow package instructions). DO NOT DRIVE WHILE TAKING NARCOTIC PAIN MEDICATIONS. DO NOT TAKE SLEEPING MEDICATIONS OR ANTIANXIETY MEDICATIONS WHILE TAKING NARCOTIC PAIN MEDICATIONS,  ESPECIALLY THE NIGHT OF ANESTHESIA! CPAP PATIENTS BE SURE TO WEAR MACHINE WHENEVER NAPPING OR SLEEPING! DISCHARGE SUMMARY from Nurse    The following personal items collected during your admission are returned to you:   Dental Appliance: Dental Appliances: None  Vision: Visual Aid: Glasses (PACU)  Hearing Aid:    Jewelry: Jewelry: None  Clothing: Clothing: With patient  Other Valuables:  Other Valuables: Eyeglasses (PACU)  Valuables sent to safe:        PATIENT INSTRUCTIONS:    After General Anesthesia or Intravenous Sedation, for 24 hours or while taking prescription Narcotics:        Someone should be with you for the next 24 hours.        For your own safety, a responsible adult must drive you home. Limit your activities  Recommended activity: Rest today, up with assistance today. Do not climb stairs or shower unattended for the next 24 hours. Please start with a soft bland diet and advance as tolerated (no nausea) to regular diet. If you have a sore throat you should try the following: fluids, warm salt water gargles, or throat lozenges. If it does not improve after several days please follow up with your primary physician. Do not drive and operate hazardous machinery  Do not make important personal or business decisions  Do  not drink alcoholic beverages  If you have not urinated within 8 hours after discharge, please contact your surgeon on call. Report the following to your surgeon:  Excessive pain, swelling, redness or odor of or around the surgical area  Temperature over 100.5  Nausea and vomiting lasting longer than 4 hours or if unable to take medications  Any signs of decreased circulation or nerve impairment to extremity: change in color, persistent  numbness, tingling, coldness or increase pain      You will receive a Post Operative Call from one of the Recovery Room Nurses on the day after your surgery to check on you. It is very important for us to know how you are recovering after your surgery. If you have an issue or need to speak with someone, please call your surgeon, do not wait for the post operative call. You may receive an e-mail or letter in the mail from [employer] regarding your experience with us in the Ambulatory Surgery Unit. Your feedback is valuable to us and we appreciate your participation in the survey. If the above instructions are not adequate or you are having problems after your surgery, call the physician at their office number. We wish you a speedy recovery ? What to do at Home:      *  Please give a list of your current medications to your Primary Care Provider.     * Please update this list whenever your medications are discontinued, doses are      changed, or new medications (including over-the-counter products) are added. *  Please carry medication information at all times in case of emergency situations. If you have not received your influenza and/or pneumococcal vaccine, please follow up with your primary care physician. The discharge information has been reviewed with the patient and caregiver. The patient and caregiver verbalized understanding.

## 2022-08-08 NOTE — PERIOP NOTES
Patient: Marisela Hutton MRN: [id number]  SSN: [id number]   YOB: 2000  Age: 23 y.o. Sex: female     Patient is status post Procedure(s):  LAPAROSCOPIC RIGHT OOPHORECTOMY, LEFT CYSTECTOMY AND CHROMOPERTUBATION. Surgeon(s) and Role:     * Sherryle Pollard, MD - Primary    Local/Dose/Irrigation:  SEE MAR                  Peripheral IV 08/08/22 Left Antecubital (Active)   Site Assessment Clean, dry, & intact 08/08/22 1123   Phlebitis Assessment 0 08/08/22 1123   Infiltration Assessment 0 08/08/22 1123   Dressing Status New 08/08/22 1123   Dressing Type Transparent;Tape 08/08/22 1123   Hub Color/Line Status Pink; Infusing 08/08/22 1123          Orogastric Tube 08/08/22 (Active)      Airway - Endotracheal Tube 08/08/22 Oral (Active)                   Dressing/Packing:  Incision 08/08/22 Abdomen-Dressing/Treatment: Skin glue (TO 3 TROCAR SITES) (08/08/22 1500)  Incision 08/08/22 Vagina-Dressing/Treatment: Alicja Olp (08/08/22 1500)

## 2022-11-08 ENCOUNTER — NURSE TRIAGE (OUTPATIENT)
Dept: OTHER | Facility: CLINIC | Age: 22
End: 2022-11-08

## 2022-11-08 ENCOUNTER — OFFICE VISIT (OUTPATIENT)
Dept: INTERNAL MEDICINE CLINIC | Age: 22
End: 2022-11-08
Payer: MEDICAID

## 2022-11-08 VITALS
DIASTOLIC BLOOD PRESSURE: 78 MMHG | RESPIRATION RATE: 16 BRPM | SYSTOLIC BLOOD PRESSURE: 120 MMHG | HEIGHT: 62 IN | OXYGEN SATURATION: 98 % | BODY MASS INDEX: 38.83 KG/M2 | TEMPERATURE: 98.5 F | WEIGHT: 211 LBS | HEART RATE: 82 BPM

## 2022-11-08 DIAGNOSIS — S69.92XA HAND INJURY, LEFT, INITIAL ENCOUNTER: Primary | ICD-10-CM

## 2022-11-08 DIAGNOSIS — S69.92XA WRIST INJURIES, LEFT, INITIAL ENCOUNTER: ICD-10-CM

## 2022-11-08 DIAGNOSIS — F41.9 ANXIETY: ICD-10-CM

## 2022-11-08 PROBLEM — E66.01 OBESITY, MORBID (HCC): Status: RESOLVED | Noted: 2018-06-20 | Resolved: 2022-11-08

## 2022-11-08 PROCEDURE — 99214 OFFICE O/P EST MOD 30 MIN: CPT | Performed by: FAMILY MEDICINE

## 2022-11-08 RX ORDER — BUSPIRONE HYDROCHLORIDE 5 MG/1
5 TABLET ORAL
Qty: 90 TABLET | Refills: 3 | Status: SHIPPED | OUTPATIENT
Start: 2022-11-08

## 2022-11-08 RX ORDER — IBUPROFEN 800 MG/1
TABLET ORAL
Qty: 30 TABLET | Refills: 0 | Status: SHIPPED | OUTPATIENT
Start: 2022-11-08
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
## 2022-11-08 NOTE — LETTER
NOTIFICATION RETURN TO WORK / SCHOOL    11/8/2022 2:11 PM    Ms. Blaze Vargas  [address] [facility] [address]      To Whom It May Concern:    Blaze Vargas is currently under the care of [address]. She will return to work/school on: Tuesday, November 15, 2022. If there are questions or concerns please have the patient contact our office.         Sincerely,      Issa Emerson MD
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
## 2022-11-17 ENCOUNTER — VIRTUAL VISIT (OUTPATIENT)
Dept: INTERNAL MEDICINE CLINIC | Age: 22
End: 2022-11-17
Payer: MEDICAID

## 2022-11-17 DIAGNOSIS — F41.9 ANXIETY: ICD-10-CM

## 2022-11-17 DIAGNOSIS — F32.1 MODERATE MAJOR DEPRESSION (HCC): ICD-10-CM

## 2022-11-17 DIAGNOSIS — S69.92XS: Primary | ICD-10-CM

## 2022-11-17 PROCEDURE — 99213 OFFICE O/P EST LOW 20 MIN: CPT | Performed by: FAMILY MEDICINE
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
## 2022-11-17 NOTE — PROGRESS NOTES
Chief Complaint   Patient presents with    Hand Pain     Pt wants an XR and a referral for her hand     Patient is aware that this is a Virtual Visit or Phone Call Only doctor's visit. Patient has not been out of the country in (14 months), NO diarrhea, NO cough, NO chest conjestion, NO temp. Pt has not been around anyone with these symptoms. Health Maintenance reviewed. I have reviewed the patient's medical history in detail and updated the computerized patient record. Have you been to the ER, urgent care clinic since your last visit? No Hospitalized since your last visit? No     2. Have you seen or consulted any other health care providers outside of the [address] [place] [address] since your last visit? No  Include any pap smears or colon screening. Encouraged pt to discuss pt's wishes with spouse/partner/family and bring them in the next appt to follow thru with the Advanced Directive      Fall Risk Assessment, last 12 mths 9/29/2020   Able to walk? Yes   Fall in past 12 months?  No       3 most recent PHQ Screens 11/8/2022   Little interest or pleasure in doing things Several days   Feeling down, depressed, irritable, or hopeless Several days   Total Score PHQ 2 2   Trouble falling or staying asleep, or sleeping too much -   Feeling tired or having little energy -   Poor appetite, weight loss, or overeating -   Feeling bad about yourself - or that you are a failure or have let yourself or your family down -   Trouble concentrating on things such as school, work, reading, or watching TV -   Moving or speaking so slowly that other people could have noticed; or the opposite being so fidgety that others notice -   Thoughts of being better off dead, or hurting yourself in some way -   PHQ 9 Score -   How difficult have these problems made it for you to do your work, take care of your home and get along with others -       Abuse Screening Questionnaire 11/22/2021   Do you ever feel afraid of your partner? N   Are you in a relationship with someone who physically or mentally threatens you? N   Is it safe for you to go home?  Y       ADL Assessment 11/22/2021   Feeding yourself No Help Needed   Getting from bed to chair No Help Needed   Getting dressed No Help Needed   Bathing or showering No Help Needed   Walk across the room (includes cane/walker) No Help Needed   Using the telphone No Help Needed   Taking your medications No Help Needed   Preparing meals No Help Needed   Managing money (expenses/bills) No Help Needed   Moderately strenuous housework (laundry) No Help Needed   Shopping for personal items (toiletries/medicines) No Help Needed   Shopping for groceries No Help Needed   Driving No Help Needed   Climbing a flight of stairs No Help Needed   Getting to places beyond walking distances No Help Needed

## 2022-11-18 ENCOUNTER — OFFICE VISIT (OUTPATIENT)
Dept: ORTHOPEDIC SURGERY | Age: 22
End: 2022-11-18
Payer: MEDICAID

## 2022-11-18 VITALS — BODY MASS INDEX: 38.64 KG/M2 | HEIGHT: 62 IN | WEIGHT: 210 LBS

## 2022-11-18 DIAGNOSIS — S66.812A FLEXOR TENDON RUPTURE OF HAND, LEFT, INITIAL ENCOUNTER: Primary | ICD-10-CM

## 2022-11-18 DIAGNOSIS — M79.642 LEFT HAND PAIN: ICD-10-CM

## 2022-11-18 DIAGNOSIS — S62.643A CLOSED NONDISPLACED FRACTURE OF PROXIMAL PHALANX OF LEFT MIDDLE FINGER, INITIAL ENCOUNTER: ICD-10-CM

## 2022-11-18 PROCEDURE — 99203 OFFICE O/P NEW LOW 30 MIN: CPT | Performed by: ORTHOPAEDIC SURGERY

## 2022-11-18 NOTE — LETTER
11/18/2022    Patient: Hugo Alvarez   YOB: 2000   Date of Visit: 11/18/2022     Klaudia Chow MD  [address]  [facility]    Dear Klaudia Chow MD,      Thank you for referring Ms. Teresa Russo to [facility] for evaluation. My notes for this consultation are attached. If you have questions, please do not hesitate to call me. I look forward to following your patient along with you.       Sincerely,    Branden Zayas MD
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
## 2022-11-28 DIAGNOSIS — S62.643A CLOSED NONDISPLACED FRACTURE OF PROXIMAL PHALANX OF LEFT MIDDLE FINGER, INITIAL ENCOUNTER: ICD-10-CM

## 2022-11-28 DIAGNOSIS — S66.812A FLEXOR TENDON RUPTURE OF HAND, LEFT, INITIAL ENCOUNTER: Primary | ICD-10-CM

## 2022-11-30 DIAGNOSIS — S66.812A FLEXOR TENDON RUPTURE OF HAND, LEFT, INITIAL ENCOUNTER: Primary | ICD-10-CM

## 2022-11-30 RX ORDER — ONDANSETRON 4 MG/1
4 TABLET, FILM COATED ORAL
Qty: 15 TABLET | Refills: 0 | Status: SHIPPED | OUTPATIENT
Start: 2022-11-30

## 2022-11-30 RX ORDER — HYDROCODONE BITARTRATE AND ACETAMINOPHEN 5; 325 MG/1; MG/1
1 TABLET ORAL
Qty: 15 TABLET | Refills: 0 | Status: SHIPPED | OUTPATIENT
Start: 2022-11-30 | End: 2022-12-07

## 2022-12-02 ENCOUNTER — TELEPHONE (OUTPATIENT)
Dept: ORTHOPEDIC SURGERY | Age: 22
End: 2022-12-02

## 2022-12-02 DIAGNOSIS — S66.812A FLEXOR TENDON RUPTURE OF HAND, LEFT, INITIAL ENCOUNTER: Primary | ICD-10-CM

## 2022-12-02 DIAGNOSIS — M79.642 LEFT HAND PAIN: ICD-10-CM

## 2022-12-02 NOTE — TELEPHONE ENCOUNTER
Pt underwent left hand flexor tendon reconstruction surgery on 12/1/22 and Dr. Laura Issa would like her to follow up with a hand therapist to mold a dorsal blocking splint. Scheduled an appointment with Verónica Bardales at [address] [place] [address], LISA#[phone], [address] [address], [address], [address] [facility]. This appointment has a 11:00am arrival. She will then see Dr. Laura Issa that afternoon. This information was emailed to the patient. An order for hand therapy was sent to [facility].

## 2022-12-06 ENCOUNTER — OFFICE VISIT (OUTPATIENT)
Dept: ORTHOPEDIC SURGERY | Age: 22
End: 2022-12-06
Payer: MEDICAID

## 2022-12-06 VITALS — HEIGHT: 62 IN | BODY MASS INDEX: 38.64 KG/M2 | WEIGHT: 210 LBS

## 2022-12-06 DIAGNOSIS — M79.642 LEFT HAND PAIN: ICD-10-CM

## 2022-12-06 DIAGNOSIS — S66.812D FLEXOR TENDON RUPTURE OF HAND, LEFT, SUBSEQUENT ENCOUNTER: Primary | ICD-10-CM

## 2022-12-06 DIAGNOSIS — S62.643D CLOSED NONDISPLACED FRACTURE OF PROXIMAL PHALANX OF LEFT MIDDLE FINGER WITH ROUTINE HEALING, SUBSEQUENT ENCOUNTER: ICD-10-CM

## 2022-12-06 DIAGNOSIS — S66.812A FLEXOR TENDON RUPTURE OF HAND, LEFT, INITIAL ENCOUNTER: Primary | ICD-10-CM

## 2022-12-06 PROCEDURE — 99024 POSTOP FOLLOW-UP VISIT: CPT | Performed by: ORTHOPAEDIC SURGERY

## 2022-12-06 NOTE — LETTER
12/6/2022    Patient: Cedrick Thurston   YOB: 2000   Date of Visit: 12/6/2022     Curt Jarquin MD  [address]  [facility]    Dear Curt Jarquin MD,      Thank you for referring Ms. Kyle Hernandez to [facility] for evaluation. My notes for this consultation are attached. If you have questions, please do not hesitate to call me. I look forward to following your patient along with you.       Sincerely,    Trudy Doran MD
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
## 2022-12-06 NOTE — LETTER
12/6/2022     Ms. Yvan Ayala  [id number]  [phone]      Patient had left hand surgery 12/1/22. She is unable to drive. At this time, she is unable to work until approximately 1/12/23. She will follow up in the office in 2-3 weeks.         Sincerely,      Sharon Macdonald MD

## 2022-12-20 ENCOUNTER — OFFICE VISIT (OUTPATIENT)
Dept: ORTHOPEDIC SURGERY | Age: 22
End: 2022-12-20
Payer: MEDICAID

## 2022-12-20 DIAGNOSIS — M79.642 LEFT HAND PAIN: ICD-10-CM

## 2022-12-20 DIAGNOSIS — S66.812D FLEXOR TENDON RUPTURE OF HAND, LEFT, SUBSEQUENT ENCOUNTER: Primary | ICD-10-CM

## 2022-12-20 DIAGNOSIS — S62.643D CLOSED NONDISPLACED FRACTURE OF PROXIMAL PHALANX OF LEFT MIDDLE FINGER WITH ROUTINE HEALING, SUBSEQUENT ENCOUNTER: ICD-10-CM

## 2022-12-20 PROCEDURE — 99024 POSTOP FOLLOW-UP VISIT: CPT | Performed by: ORTHOPAEDIC SURGERY

## 2022-12-20 NOTE — LETTER
12/20/2022    Patient: Cedrick Thurston   YOB: 2000   Date of Visit: 12/20/2022     Curt Jarquin MD  [address]  [facility]    Dear Curt Jarquin MD,      Thank you for referring Ms. Kyle Hernandez to [facility] for evaluation. My notes for this consultation are attached. If you have questions, please do not hesitate to call me. I look forward to following your patient along with you.       Sincerely,    Trudy Doran MD
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
## 2023-01-10 ENCOUNTER — OFFICE VISIT (OUTPATIENT)
Dept: ORTHOPEDIC SURGERY | Age: 23
End: 2023-01-10
Payer: MEDICAID

## 2023-01-10 VITALS — HEIGHT: 62 IN | WEIGHT: 210 LBS | BODY MASS INDEX: 38.64 KG/M2

## 2023-01-10 DIAGNOSIS — M79.642 LEFT HAND PAIN: ICD-10-CM

## 2023-01-10 DIAGNOSIS — S66.812D FLEXOR TENDON RUPTURE OF HAND, LEFT, SUBSEQUENT ENCOUNTER: Primary | ICD-10-CM

## 2023-01-10 DIAGNOSIS — S62.643D CLOSED NONDISPLACED FRACTURE OF PROXIMAL PHALANX OF LEFT MIDDLE FINGER WITH ROUTINE HEALING, SUBSEQUENT ENCOUNTER: ICD-10-CM

## 2023-01-10 PROCEDURE — 99024 POSTOP FOLLOW-UP VISIT: CPT | Performed by: ORTHOPAEDIC SURGERY
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
## 2023-01-10 NOTE — LETTER
1/10/2023     Ms. Dalia Melo  [address] [id number]                This patient may return to light duty work with no lifting with her left hand.       Sincerely,      Faith De La Torre MD

## 2023-01-10 NOTE — LETTER
1/10/2023    Patient: Chio Garcia   YOB: 2000   Date of Visit: 1/10/2023     Tresa Chilel MD  [address]  [place]    Dear Tresa Chilel MD,      Thank you for referring Ms. Kaushal Starr to [facility] for evaluation. My notes for this consultation are attached. If you have questions, please do not hesitate to call me. I look forward to following your patient along with you.       Sincerely,    Dana Dalton MD

## 2023-02-21 ENCOUNTER — OFFICE VISIT (OUTPATIENT)
Dept: ORTHOPEDIC SURGERY | Age: 23
End: 2023-02-21
Payer: MEDICAID

## 2023-02-21 VITALS — BODY MASS INDEX: 38.64 KG/M2 | HEIGHT: 62 IN | WEIGHT: 210 LBS

## 2023-02-21 DIAGNOSIS — S66.812D FLEXOR TENDON RUPTURE OF HAND, LEFT, SUBSEQUENT ENCOUNTER: Primary | ICD-10-CM

## 2023-02-21 DIAGNOSIS — M79.642 LEFT HAND PAIN: ICD-10-CM

## 2023-02-21 DIAGNOSIS — S62.643D CLOSED NONDISPLACED FRACTURE OF PROXIMAL PHALANX OF LEFT MIDDLE FINGER WITH ROUTINE HEALING, SUBSEQUENT ENCOUNTER: ICD-10-CM

## 2023-02-21 PROCEDURE — 99024 POSTOP FOLLOW-UP VISIT: CPT | Performed by: ORTHOPAEDIC SURGERY

## 2023-02-21 NOTE — LETTER
2/21/2023    Patient: Mere Wills   YOB: 2000   Date of Visit: 2/21/2023     Judith Thapa MD  [address]  [facility]    Dear Judith Thapa MD,      Thank you for referring Ms. José Miguel Mcknight to [facility] for evaluation. My notes for this consultation are attached. If you have questions, please do not hesitate to call me. I look forward to following your patient along with you.       Sincerely,    Terrie Kramer MD
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
## 2023-02-21 NOTE — LETTER
2/21/2023     Ms. Holly Hickman  [address] [address]      This patient may return to work on 02/27/2023.         Sincerely,      Bev Calderon MD

## 2023-10-13 ENCOUNTER — TELEPHONE (OUTPATIENT)
Facility: CLINIC | Age: 23
End: 2023-10-13

## 2023-10-13 NOTE — TELEPHONE ENCOUNTER
----- Message from Brenda Sidhu sent at 10/11/2023  3:27 PM EDT -----  Subject: Referral Request    Reason for referral request? labs  Provider patient wants to be referred to(if known):     Provider Phone Number(if known):     Additional Information for Provider? labs  ---------------------------------------------------------------------------  --------------  Ayaz Conte INFO    [phone]; OK to leave message on voicemail  ---------------------------------------------------------------------------  --------------

## 2023-10-14 DIAGNOSIS — Z13.1 SCREENING FOR DIABETES MELLITUS: ICD-10-CM

## 2023-10-14 DIAGNOSIS — Z11.59 ENCOUNTER FOR HEPATITIS C SCREENING TEST FOR LOW RISK PATIENT: Primary | ICD-10-CM

## 2023-10-25 ENCOUNTER — TELEPHONE (OUTPATIENT)
Facility: CLINIC | Age: 23
End: 2023-10-25

## 2023-10-25 NOTE — TELEPHONE ENCOUNTER
----- Message from Toby Treadwell sent at 10/16/2023  1:17 PM EDT -----  Subject: Message to Provider    QUESTIONS  Information for Provider? pt needs an appt for labs. Please contact the pt  ---------------------------------------------------------------------------  --------------  Maikel January INFO  [phone]; OK to leave message on voicemail  ---------------------------------------------------------------------------  --------------  SCRIPT ANSWERS  Relationship to Patient?  Self

## 2023-12-08 ENCOUNTER — OFFICE VISIT (OUTPATIENT)
Facility: CLINIC | Age: 23
End: 2023-12-08

## 2023-12-08 VITALS
SYSTOLIC BLOOD PRESSURE: 114 MMHG | WEIGHT: 220 LBS | HEART RATE: 83 BPM | TEMPERATURE: 98.5 F | OXYGEN SATURATION: 97 % | HEIGHT: 62 IN | DIASTOLIC BLOOD PRESSURE: 80 MMHG | BODY MASS INDEX: 40.48 KG/M2 | RESPIRATION RATE: 18 BRPM

## 2023-12-08 DIAGNOSIS — J45.909 MODERATE ASTHMA WITHOUT COMPLICATION, UNSPECIFIED WHETHER PERSISTENT: ICD-10-CM

## 2023-12-08 DIAGNOSIS — J40 BRONCHITIS: Primary | ICD-10-CM

## 2023-12-08 RX ORDER — AZITHROMYCIN 250 MG/1
250 TABLET, FILM COATED ORAL SEE ADMIN INSTRUCTIONS
Qty: 6 TABLET | Refills: 0 | Status: SHIPPED | OUTPATIENT
Start: 2023-12-08 | End: 2023-12-13

## 2023-12-08 RX ORDER — AZITHROMYCIN 250 MG/1
250 TABLET, FILM COATED ORAL SEE ADMIN INSTRUCTIONS
Qty: 6 TABLET | Refills: 0 | Status: SHIPPED | OUTPATIENT
Start: 2023-12-08 | End: 2023-12-08 | Stop reason: SDUPTHER

## 2023-12-08 RX ORDER — METHYLPREDNISOLONE 4 MG/1
TABLET ORAL
Qty: 1 KIT | Refills: 0 | Status: SHIPPED | OUTPATIENT
Start: 2023-12-08 | End: 2023-12-08 | Stop reason: SDUPTHER

## 2023-12-08 RX ORDER — FLUCONAZOLE 150 MG/1
150 TABLET ORAL ONCE
Qty: 1 TABLET | Refills: 0 | Status: SHIPPED | OUTPATIENT
Start: 2023-12-08 | End: 2023-12-08

## 2023-12-08 RX ORDER — METHYLPREDNISOLONE 4 MG/1
TABLET ORAL
Qty: 1 KIT | Refills: 0 | Status: SHIPPED | OUTPATIENT
Start: 2023-12-08 | End: 2023-12-14

## 2023-12-08 RX ORDER — FLUCONAZOLE 150 MG/1
150 TABLET ORAL ONCE
Qty: 1 TABLET | Refills: 0 | Status: SHIPPED | OUTPATIENT
Start: 2023-12-08 | End: 2023-12-08 | Stop reason: SDUPTHER

## 2023-12-08 NOTE — PATIENT INSTRUCTIONS
It was a pleasure to see you today. 1. Bronchitis    2. Moderate asthma without complication, unspecified whether persistent       Z pack and medrol dose pack prescribed. Do a home COVID test and call clinic with results. Return if symptoms worsen or fail to improve. Thank you for choosing [id number].     FOREST Grimaldo - NP

## 2024-01-16 ENCOUNTER — OFFICE VISIT (OUTPATIENT)
Facility: CLINIC | Age: 24
End: 2024-01-16
Payer: MEDICAID

## 2024-01-16 VITALS
RESPIRATION RATE: 20 BRPM | SYSTOLIC BLOOD PRESSURE: 119 MMHG | TEMPERATURE: 98.9 F | BODY MASS INDEX: 41.77 KG/M2 | WEIGHT: 227 LBS | DIASTOLIC BLOOD PRESSURE: 76 MMHG | HEIGHT: 62 IN | OXYGEN SATURATION: 96 % | HEART RATE: 82 BPM

## 2024-01-16 DIAGNOSIS — J02.9 SORE THROAT: Primary | ICD-10-CM

## 2024-01-16 DIAGNOSIS — R05.9 COUGH, UNSPECIFIED TYPE: ICD-10-CM

## 2024-01-16 PROCEDURE — 99212 OFFICE O/P EST SF 10 MIN: CPT | Performed by: NURSE PRACTITIONER

## 2024-01-16 RX ORDER — AZITHROMYCIN 250 MG/1
250 TABLET, FILM COATED ORAL SEE ADMIN INSTRUCTIONS
Qty: 6 TABLET | Refills: 0 | Status: SHIPPED | OUTPATIENT
Start: 2024-01-16 | End: 2024-01-21

## 2024-01-16 RX ORDER — GUAIFENESIN AND DEXTROMETHORPHAN HYDROBROMIDE 600; 30 MG/1; MG/1
1 TABLET, EXTENDED RELEASE ORAL EVERY 12 HOURS
Qty: 20 TABLET | Refills: 0 | Status: SHIPPED | OUTPATIENT
Start: 2024-01-16 | End: 2024-01-26

## 2024-01-16 ASSESSMENT — ENCOUNTER SYMPTOMS
SINUS PRESSURE: 0
WHEEZING: 0
CONSTIPATION: 0
CHEST TIGHTNESS: 0
SHORTNESS OF BREATH: 0
ABDOMINAL PAIN: 0
COUGH: 1
SINUS PAIN: 0
DIARRHEA: 0
SORE THROAT: 1
RHINORRHEA: 0
NAUSEA: 0

## 2024-01-16 NOTE — PROGRESS NOTES
Janie Cheung (:  2000) is a 24 y.o. female who presents to the office today for the following:  Pharyngitis and Cough         ASSESSMENT/PLAN:  1. Sore throat  2. Cough, unspecified type      No results found for any visits on 24.     Return if symptoms worsen or fail to improve.         Subjective   SUBJECTIVE/OBJECTIVE:  Pharyngitis  Associated symptoms include chills, coughing, diaphoresis and a sore throat. Pertinent negatives include no abdominal pain, chest pain, fatigue, fever, headaches, joint swelling, myalgias, nausea, numbness, rash or weakness.   Cough  Associated symptoms include chills and a sore throat. Pertinent negatives include no chest pain, ear pain, fever, headaches, myalgias, rash, rhinorrhea, shortness of breath or wheezing.   Pt presents with  a sore throat cough for 2 days. Pt reports subjective fever of sweats. No documented fever, n/v/d/c. Pt denies wheezing sob ear fullness rhinorrhea. Pt taking OTC sudafed without relief. Pt states has had sick contacts. Did not test for COVID at home.    Allergies   Allergen Reactions    Penicillins Hives    Oxycodone Nausea And Vomiting     Current Outpatient Medications   Medication Sig Dispense Refill    azithromycin (ZITHROMAX) 250 MG tablet Take 1 tablet by mouth See Admin Instructions for 5 days 500mg on day 1 followed by 250mg on days 2 - 5 6 tablet 0    Dextromethorphan-guaiFENesin (MUCINEX DM)  MG TB12 Take 1 tablet by mouth in the morning and 1 tablet in the evening. Do all this for 10 days. 20 tablet 0    hydrocortisone 2.5 % cream Apply topically 2 times daily 45 g 2    busPIRone (BUSPAR) 5 MG tablet Take 1 tablet by mouth 3 times daily as needed (anxiety) 270 tablet 1    albuterol sulfate HFA (PROVENTIL;VENTOLIN;PROAIR) 108 (90 Base) MCG/ACT inhaler Inhale 1 puff into the lungs every 6 hours as needed for Wheezing 18 g 5    albuterol (PROVENTIL) (2.5 MG/3ML) 0.083% nebulizer solution Inhale 3 mLs into the lungs every

## 2024-01-16 NOTE — PROGRESS NOTES
Chief Complaint   Patient presents with    Pharyngitis    Cough     Symptoms started 2 days ago

## 2024-01-16 NOTE — PATIENT INSTRUCTIONS
It was a pleasure to see you today.    1. Sore throat    2. Cough, unspecified type         No follow-ups on file.     Thank you for choosing [facility].    FOREST Marti NP

## 2024-01-17 ENCOUNTER — OFFICE VISIT (OUTPATIENT)
Facility: CLINIC | Age: 24
End: 2024-01-17
Payer: MEDICAID

## 2024-01-17 VITALS
HEIGHT: 62 IN | DIASTOLIC BLOOD PRESSURE: 89 MMHG | TEMPERATURE: 98.4 F | RESPIRATION RATE: 16 BRPM | HEART RATE: 77 BPM | OXYGEN SATURATION: 98 % | WEIGHT: 222 LBS | SYSTOLIC BLOOD PRESSURE: 139 MMHG | BODY MASS INDEX: 40.85 KG/M2

## 2024-01-17 DIAGNOSIS — R07.0 THROAT DISCOMFORT: Primary | ICD-10-CM

## 2024-01-17 DIAGNOSIS — U07.1 COVID: ICD-10-CM

## 2024-01-17 DIAGNOSIS — J45.20 MILD INTERMITTENT ASTHMA WITHOUT COMPLICATION: ICD-10-CM

## 2024-01-17 LAB
GROUP A STREP ANTIGEN, POC: NEGATIVE
VALID INTERNAL CONTROL, POC: YES

## 2024-01-17 PROCEDURE — 99214 OFFICE O/P EST MOD 30 MIN: CPT | Performed by: FAMILY MEDICINE

## 2024-01-17 PROCEDURE — 87880 STREP A ASSAY W/OPTIC: CPT | Performed by: FAMILY MEDICINE

## 2024-01-17 ASSESSMENT — PATIENT HEALTH QUESTIONNAIRE - PHQ9
SUM OF ALL RESPONSES TO PHQ QUESTIONS 1-9: 0
1. LITTLE INTEREST OR PLEASURE IN DOING THINGS: 0
2. FEELING DOWN, DEPRESSED OR HOPELESS: 0
SUM OF ALL RESPONSES TO PHQ QUESTIONS 1-9: 0
SUM OF ALL RESPONSES TO PHQ9 QUESTIONS 1 & 2: 0
SUM OF ALL RESPONSES TO PHQ QUESTIONS 1-9: 0
SUM OF ALL RESPONSES TO PHQ QUESTIONS 1-9: 0

## 2024-01-18 ENCOUNTER — TELEPHONE (OUTPATIENT)
Facility: CLINIC | Age: 24
End: 2024-01-18

## 2024-01-18 DIAGNOSIS — U07.1 COVID: Primary | ICD-10-CM

## 2024-01-18 NOTE — PROGRESS NOTES
Chief Complaint   Patient presents with    Oral Swelling     Cough, sore throat, ears painful, pt wants her throat swabbed.  Pt has not gotten her antib yet from yesterday     Patient has not been out of the country in (14 months), NO diarrhea, NO cough, NO chest conjestion, NO temp.  Pt has not been around anyone with these symptoms.     Health Maintenance reviewed.    I have reviewed the patient's medical history in detail and updated the computerized patient record.    \"Have you been to the ER, urgent care clinic since your last visit? No  Hospitalized since your last visit?\"    no    “Have you seen or consulted any other health care providers outside of [facility] since your last visit?”    no                                         
negative    Assessment/Plan:     Paxlovid called in     Diagnosis Orders   1. Throat discomfort  AMB POC RAPID STREP A      2. COVID        3. Mild intermittent asthma without complication          Orders Placed This Encounter    AMB POC RAPID STREP A     Current Outpatient Medications   Medication Sig Dispense Refill    hydrocortisone 2.5 % cream Apply topically 2 times daily 45 g 2    busPIRone (BUSPAR) 5 MG tablet Take 1 tablet by mouth 3 times daily as needed (anxiety) 270 tablet 1    albuterol sulfate HFA (PROVENTIL;VENTOLIN;PROAIR) 108 (90 Base) MCG/ACT inhaler Inhale 1 puff into the lungs every 6 hours as needed for Wheezing 18 g 5    albuterol (PROVENTIL) (2.5 MG/3ML) 0.083% nebulizer solution Inhale 3 mLs into the lungs every 4 hours as needed      butalbital-acetaminophen-caffeine (FIORICET, ESGIC) -40 MG per tablet Take 1-2 tablets by mouth every 6 hours as needed      ibuprofen (ADVIL;MOTRIN) 800 MG tablet Take one tablet every 8 hours for three days, and then every 8 hours as needed for pain thereafter      ondansetron (ZOFRAN-ODT) 4 MG disintegrating tablet Take 1 tablet by mouth every 8 hours as needed      nirmatrelvir/ritonavir 300/100 (PAXLOVID) 20 x 150 MG & 10 x 100MG TBPK Take 3 tablets (two 150 mg nirmatrelvir and one 100 mg ritonavir tablets) by mouth every 12 hours for 5 days. 30 tablet 0    azithromycin (ZITHROMAX) 250 MG tablet Take 1 tablet by mouth See Admin Instructions for 5 days 500mg on day 1 followed by 250mg on days 2 - 5 6 tablet 0    Dextromethorphan-guaiFENesin (MUCINEX DM)  MG TB12 Take 1 tablet by mouth in the morning and 1 tablet in the evening. Do all this for 10 days. 20 tablet 0     No current facility-administered medications for this visit.     Will give her a note for work quarantine for a week follow-up no better

## 2024-01-18 NOTE — TELEPHONE ENCOUNTER
Pt kendraas seen yesterday and the day before, she has tested positive for covid as of last night and is asking for something to be called in today to [facility] [phone]

## 2024-04-26 ENCOUNTER — TELEPHONE (OUTPATIENT)
Facility: CLINIC | Age: 24
End: 2024-04-26

## 2024-04-26 NOTE — TELEPHONE ENCOUNTER
Patient having swelling in her ankles, ecc triage called on back line to transfer but pt had hung up. Please call to advise at [phone]

## 2024-05-01 NOTE — PROGRESS NOTES
Janie Cheung, was evaluated through a synchronous (real-time) audio-video encounter. The patient (or guardian if applicable) is aware that this is a billable service, which includes applicable co-pays. This Virtual Visit was conducted with patient's (and/or legal guardian's) consent. Patient identification was verified, and a caregiver was present when appropriate.   The patient was located at Home: [address] [address]  Provider was located at Facility (Appt Dept): [address] [facility]  [address]  [place] [address]  Confirm you are appropriately licensed, registered, or certified to deliver care in the state where the patient is located as indicated above. If you are not or unsure, please re-schedule the visit: Yes, I confirm.     Janie Cheung (:  2000) is a Established patient, presenting virtually for evaluation of the following: Swelling lower legs    Assessment & Plan   Below is the assessment and plan developed based on review of pertinent history, physical exam, labs, studies, and medications.  Assessment & Plan  1. Lymphedema.  A comprehensive discussion was held with the patient regarding lymphedema, its associated risks, and benefits. A prescription for medical support hose has been issued. Additionally, a prescription for diuretic and potassium supplements has been issued.    2. Expressed interest in weight loss.  The patient was counseled on the importance of regular exercise, emphasizing that it does not significantly aid in weight loss. Dietary modifications were suggested, including a reduction in carbohydrate intake. The use of injectable GLP-1 agonists such as Mounjaro, Ozempic, and Wegovy for weight loss was also discussed.    Follow-up  A follow-up appointment has been scheduled for the patient in 6 weeks.    Results    1. Lymphedema  -     DME Order for (Specify) as OP  -     furosemide (LASIX) 20 MG tablet; Take 1 tablet by mouth daily As

## 2024-05-02 ENCOUNTER — TELEMEDICINE (OUTPATIENT)
Facility: CLINIC | Age: 24
End: 2024-05-02
Payer: MEDICAID

## 2024-05-02 DIAGNOSIS — J45.40 MODERATE PERSISTENT ASTHMA WITHOUT COMPLICATION: ICD-10-CM

## 2024-05-02 DIAGNOSIS — I89.0 LYMPHEDEMA: Primary | ICD-10-CM

## 2024-05-02 DIAGNOSIS — F41.9 ANXIETY: ICD-10-CM

## 2024-05-02 DIAGNOSIS — I89.0 LYMPHEDEMA: ICD-10-CM

## 2024-05-02 DIAGNOSIS — L30.9 ECZEMA, UNSPECIFIED TYPE: ICD-10-CM

## 2024-05-02 PROBLEM — F32.1 MODERATE MAJOR DEPRESSION (HCC): Status: RESOLVED | Noted: 2018-11-30 | Resolved: 2024-05-02

## 2024-05-02 PROCEDURE — 99214 OFFICE O/P EST MOD 30 MIN: CPT | Performed by: FAMILY MEDICINE

## 2024-05-02 RX ORDER — FUROSEMIDE 20 MG/1
20 TABLET ORAL DAILY
Qty: 30 TABLET | Refills: 3 | Status: SHIPPED | OUTPATIENT
Start: 2024-05-02

## 2024-05-02 RX ORDER — BUSPIRONE HYDROCHLORIDE 5 MG/1
5 TABLET ORAL 3 TIMES DAILY PRN
Qty: 270 TABLET | Refills: 1 | Status: SHIPPED | OUTPATIENT
Start: 2024-05-02

## 2024-05-02 RX ORDER — ALBUTEROL SULFATE 90 UG/1
1 AEROSOL, METERED RESPIRATORY (INHALATION) EVERY 6 HOURS PRN
Qty: 18 G | Refills: 5 | Status: SHIPPED | OUTPATIENT
Start: 2024-05-02

## 2024-05-02 RX ORDER — POTASSIUM CHLORIDE 750 MG/1
10 TABLET, EXTENDED RELEASE ORAL DAILY
Qty: 30 TABLET | Refills: 3 | Status: SHIPPED | OUTPATIENT
Start: 2024-05-02

## 2024-05-02 SDOH — ECONOMIC STABILITY: FOOD INSECURITY: WITHIN THE PAST 12 MONTHS, YOU WORRIED THAT YOUR FOOD WOULD RUN OUT BEFORE YOU GOT MONEY TO BUY MORE.: NEVER TRUE

## 2024-05-02 SDOH — ECONOMIC STABILITY: FOOD INSECURITY: WITHIN THE PAST 12 MONTHS, THE FOOD YOU BOUGHT JUST DIDN'T LAST AND YOU DIDN'T HAVE MONEY TO GET MORE.: NEVER TRUE

## 2024-05-02 SDOH — ECONOMIC STABILITY: HOUSING INSECURITY
IN THE LAST 12 MONTHS, WAS THERE A TIME WHEN YOU DID NOT HAVE A STEADY PLACE TO SLEEP OR SLEPT IN A SHELTER (INCLUDING NOW)?: NO

## 2024-05-02 SDOH — ECONOMIC STABILITY: INCOME INSECURITY: HOW HARD IS IT FOR YOU TO PAY FOR THE VERY BASICS LIKE FOOD, HOUSING, MEDICAL CARE, AND HEATING?: NOT HARD AT ALL

## 2024-05-02 ASSESSMENT — PATIENT HEALTH QUESTIONNAIRE - PHQ9
7. TROUBLE CONCENTRATING ON THINGS, SUCH AS READING THE NEWSPAPER OR WATCHING TELEVISION: NOT AT ALL
SUM OF ALL RESPONSES TO PHQ QUESTIONS 1-9: 3
10. IF YOU CHECKED OFF ANY PROBLEMS, HOW DIFFICULT HAVE THESE PROBLEMS MADE IT FOR YOU TO DO YOUR WORK, TAKE CARE OF THINGS AT HOME, OR GET ALONG WITH OTHER PEOPLE: NOT DIFFICULT AT ALL
SUM OF ALL RESPONSES TO PHQ QUESTIONS 1-9: 2
1. LITTLE INTEREST OR PLEASURE IN DOING THINGS: SEVERAL DAYS
SUM OF ALL RESPONSES TO PHQ QUESTIONS 1-9: 2
SUM OF ALL RESPONSES TO PHQ QUESTIONS 1-9: 2
SUM OF ALL RESPONSES TO PHQ9 QUESTIONS 1 & 2: 2
5. POOR APPETITE OR OVEREATING: SEVERAL DAYS
3. TROUBLE FALLING OR STAYING ASLEEP: NOT AT ALL
SUM OF ALL RESPONSES TO PHQ QUESTIONS 1-9: 3
SUM OF ALL RESPONSES TO PHQ QUESTIONS 1-9: 2
2. FEELING DOWN, DEPRESSED OR HOPELESS: SEVERAL DAYS
SUM OF ALL RESPONSES TO PHQ9 QUESTIONS 1 & 2: 2
9. THOUGHTS THAT YOU WOULD BE BETTER OFF DEAD, OR OF HURTING YOURSELF: NOT AT ALL
1. LITTLE INTEREST OR PLEASURE IN DOING THINGS: SEVERAL DAYS
6. FEELING BAD ABOUT YOURSELF - OR THAT YOU ARE A FAILURE OR HAVE LET YOURSELF OR YOUR FAMILY DOWN: NOT AT ALL
8. MOVING OR SPEAKING SO SLOWLY THAT OTHER PEOPLE COULD HAVE NOTICED. OR THE OPPOSITE, BEING SO FIGETY OR RESTLESS THAT YOU HAVE BEEN MOVING AROUND A LOT MORE THAN USUAL: NOT AT ALL
2. FEELING DOWN, DEPRESSED OR HOPELESS: SEVERAL DAYS

## (undated) DEVICE — SHEARS ENDOSCP L36CM DIA5MM ULTRASONIC CRV TIP W/ ADV

## (undated) DEVICE — SYR 5ML 1/5 GRAD LL NSAF LF --

## (undated) DEVICE — TROCAR: Brand: KII SLEEVE

## (undated) DEVICE — SOL INJ SOD CL 0.9% 250ML BG --

## (undated) DEVICE — PAD BD MATTRESS 73X32 IN STD CONVOLUTED FOAM LTX FREE

## (undated) DEVICE — TROCARS: Brand: KII® BALLOON BLUNT TIP SYSTEM

## (undated) DEVICE — CATHETER ETER IV 20GA L125IN POLYUR STR RADPQ INTROCAN SFTY

## (undated) DEVICE — SET EXTN PRIMING 1.8ML L60IN 1.5LB MINIBOR REM SLDE CLMP

## (undated) DEVICE — HYPODERMIC SAFETY NEEDLE: Brand: MONOJECT

## (undated) DEVICE — SOL INJ L R 1000ML BG --

## (undated) DEVICE — SUTURE MCRYL SZ 4-0 L27IN ABSRB UD L19MM PS-2 1/2 CIR PRIM Y426H

## (undated) DEVICE — Device

## (undated) DEVICE — BLANKET WRM AD PREM MISTRAL-AIR

## (undated) DEVICE — SYR 50ML LR LCK 1ML GRAD NSAF --

## (undated) DEVICE — GYN LAPAROSCOPY-MRMC: Brand: MEDLINE INDUSTRIES, INC.

## (undated) DEVICE — DECANTER BAG 9": Brand: MEDLINE INDUSTRIES, INC.

## (undated) DEVICE — GARMENT,MEDLINE,DVT,INT,CALF,MED, GEN2: Brand: MEDLINE

## (undated) DEVICE — SUTURE SZ 0 27IN 5/8 CIR UR-6  TAPER PT VIOLET ABSRB VICRYL J603H

## (undated) DEVICE — ELECTRO LUBE IS A SINGLE PATIENT USE DEVICE THAT IS INTENDED TO BE USED ON ELECTROSURGICAL ELECTRODES TO REDUCE STICKING.: Brand: KEY SURGICAL ELECTRO LUBE

## (undated) DEVICE — TISSUE RETRIEVAL SYSTEM: Brand: INZII RETRIEVAL SYSTEM

## (undated) DEVICE — VCARE MEDIUM, UTERINE MANIPULATOR, VAGINAL-CERVICAL-AHLUWALIA'S-RETRACTOR-ELEVATOR: Brand: VCARE

## (undated) DEVICE — VISUALIZATION SYSTEM: Brand: CLEARIFY

## (undated) DEVICE — GOWN,SIRUS,FABRNF,XL,20/CS: Brand: MEDLINE

## (undated) DEVICE — TROCAR: Brand: KII FIOS FIRST ENTRY

## (undated) DEVICE — KENDALL DL 5 LEADS DUAL CONNECT SYSTEM BLENDED CASE - SINGLE-PATIENT-USE: Brand: SUSTAINABLE TECHNOLOGIES

## (undated) DEVICE — COVER LT HNDL PLAS RIG 1 PER PK